# Patient Record
Sex: FEMALE | Race: WHITE | Employment: OTHER | URBAN - METROPOLITAN AREA
[De-identification: names, ages, dates, MRNs, and addresses within clinical notes are randomized per-mention and may not be internally consistent; named-entity substitution may affect disease eponyms.]

---

## 2017-02-16 ENCOUNTER — HOSPITAL ENCOUNTER (OUTPATIENT)
Dept: SURGERY | Age: 67
Discharge: HOME | End: 2017-02-16
Payer: MEDICARE

## 2017-02-16 VITALS
DIASTOLIC BLOOD PRESSURE: 73 MMHG | SYSTOLIC BLOOD PRESSURE: 120 MMHG | BODY MASS INDEX: 32.72 KG/M2 | RESPIRATION RATE: 16 BRPM | OXYGEN SATURATION: 95 % | HEIGHT: 67 IN | TEMPERATURE: 97.7 F | HEART RATE: 71 BPM | WEIGHT: 208.5 LBS

## 2017-02-16 LAB
ANION GAP BLD CALC-SCNC: 11 MMOL/L (ref 7–16)
APPEARANCE UR: CLEAR
BACTERIA SPEC CULT: NORMAL
BASOPHILS # BLD AUTO: 0 K/UL (ref 0–0.2)
BASOPHILS # BLD: 0 % (ref 0–2)
BILIRUB UR QL: NEGATIVE
BUN SERPL-MCNC: 16 MG/DL (ref 8–23)
CALCIUM SERPL-MCNC: 8.7 MG/DL (ref 8.3–10.4)
CHLORIDE SERPL-SCNC: 100 MMOL/L (ref 98–107)
CO2 SERPL-SCNC: 28 MMOL/L (ref 21–32)
COLOR UR: YELLOW
CREAT SERPL-MCNC: 0.81 MG/DL (ref 0.6–1)
DIFFERENTIAL METHOD BLD: NORMAL
EOSINOPHIL # BLD: 0.1 K/UL (ref 0–0.8)
EOSINOPHIL NFR BLD: 2 % (ref 0.5–7.8)
ERYTHROCYTE [DISTWIDTH] IN BLOOD BY AUTOMATED COUNT: 12.9 % (ref 11.9–14.6)
GLUCOSE SERPL-MCNC: 79 MG/DL (ref 65–100)
GLUCOSE UR STRIP.AUTO-MCNC: NEGATIVE MG/DL
HCT VFR BLD AUTO: 43.9 % (ref 35.8–46.3)
HGB BLD-MCNC: 15.1 G/DL (ref 11.7–15.4)
HGB UR QL STRIP: NEGATIVE
IMM GRANULOCYTES # BLD: 0 K/UL (ref 0–0.5)
IMM GRANULOCYTES NFR BLD AUTO: 0.4 % (ref 0–5)
KETONES UR QL STRIP.AUTO: NEGATIVE MG/DL
LEUKOCYTE ESTERASE UR QL STRIP.AUTO: NEGATIVE
LYMPHOCYTES # BLD AUTO: 20 % (ref 13–44)
LYMPHOCYTES # BLD: 1.4 K/UL (ref 0.5–4.6)
MCH RBC QN AUTO: 32.3 PG (ref 26.1–32.9)
MCHC RBC AUTO-ENTMCNC: 34.4 G/DL (ref 31.4–35)
MCV RBC AUTO: 94 FL (ref 79.6–97.8)
MONOCYTES # BLD: 0.8 K/UL (ref 0.1–1.3)
MONOCYTES NFR BLD AUTO: 11 % (ref 4–12)
NEUTS SEG # BLD: 4.8 K/UL (ref 1.7–8.2)
NEUTS SEG NFR BLD AUTO: 67 % (ref 43–78)
NITRITE UR QL STRIP.AUTO: NEGATIVE
PH UR STRIP: 6 [PH] (ref 5–9)
PLATELET # BLD AUTO: 269 K/UL (ref 150–450)
PMV BLD AUTO: 10.8 FL (ref 10.8–14.1)
POTASSIUM SERPL-SCNC: 3.6 MMOL/L (ref 3.5–5.1)
PROT UR STRIP-MCNC: NEGATIVE MG/DL
RBC # BLD AUTO: 4.67 M/UL (ref 4.05–5.25)
SERVICE CMNT-IMP: NORMAL
SODIUM SERPL-SCNC: 139 MMOL/L (ref 136–145)
SP GR UR REFRACTOMETRY: 1.02 (ref 1–1.02)
UROBILINOGEN UR QL STRIP.AUTO: 1 EU/DL (ref 0.2–1)
WBC # BLD AUTO: 7.1 K/UL (ref 4.3–11.1)

## 2017-02-16 PROCEDURE — 85025 COMPLETE CBC W/AUTO DIFF WBC: CPT | Performed by: ANESTHESIOLOGY

## 2017-02-16 PROCEDURE — 80048 BASIC METABOLIC PNL TOTAL CA: CPT | Performed by: ANESTHESIOLOGY

## 2017-02-16 PROCEDURE — 87641 MR-STAPH DNA AMP PROBE: CPT | Performed by: ANESTHESIOLOGY

## 2017-02-16 PROCEDURE — 81003 URINALYSIS AUTO W/O SCOPE: CPT | Performed by: ANESTHESIOLOGY

## 2017-02-20 ENCOUNTER — ANESTHESIA EVENT (OUTPATIENT)
Dept: SURGERY | Age: 67
DRG: 473 | End: 2017-02-20
Payer: MEDICARE

## 2017-02-21 ENCOUNTER — ANESTHESIA (OUTPATIENT)
Dept: SURGERY | Age: 67
DRG: 473 | End: 2017-02-21
Payer: MEDICARE

## 2017-02-21 ENCOUNTER — APPOINTMENT (OUTPATIENT)
Dept: GENERAL RADIOLOGY | Age: 67
DRG: 473 | End: 2017-02-21
Attending: NEUROLOGICAL SURGERY
Payer: MEDICARE

## 2017-02-21 PROBLEM — M47.22 CERVICAL SPONDYLOSIS WITH RADICULOPATHY: Status: ACTIVE | Noted: 2017-02-21

## 2017-02-21 PROCEDURE — 74011250636 HC RX REV CODE- 250/636

## 2017-02-21 PROCEDURE — 77030020782 HC GWN BAIR PAWS FLX 3M -B: Performed by: ANESTHESIOLOGY

## 2017-02-21 PROCEDURE — 77030008703 HC TU ET UNCUF COVD -A: Performed by: ANESTHESIOLOGY

## 2017-02-21 PROCEDURE — 74011000250 HC RX REV CODE- 250

## 2017-02-21 PROCEDURE — 77030008477 HC STYL SATN SLP COVD -A: Performed by: ANESTHESIOLOGY

## 2017-02-21 PROCEDURE — 74011250636 HC RX REV CODE- 250/636: Performed by: ANESTHESIOLOGY

## 2017-02-21 PROCEDURE — 77030019908 HC STETH ESOPH SIMS -A: Performed by: ANESTHESIOLOGY

## 2017-02-21 PROCEDURE — 74011250636 HC RX REV CODE- 250/636: Performed by: NEUROLOGICAL SURGERY

## 2017-02-21 RX ORDER — LIDOCAINE HYDROCHLORIDE 20 MG/ML
INJECTION, SOLUTION EPIDURAL; INFILTRATION; INTRACAUDAL; PERINEURAL AS NEEDED
Status: DISCONTINUED | OUTPATIENT
Start: 2017-02-21 | End: 2017-02-21 | Stop reason: HOSPADM

## 2017-02-21 RX ORDER — PROPOFOL 10 MG/ML
INJECTION, EMULSION INTRAVENOUS AS NEEDED
Status: DISCONTINUED | OUTPATIENT
Start: 2017-02-21 | End: 2017-02-21 | Stop reason: HOSPADM

## 2017-02-21 RX ORDER — ROCURONIUM BROMIDE 10 MG/ML
INJECTION, SOLUTION INTRAVENOUS AS NEEDED
Status: DISCONTINUED | OUTPATIENT
Start: 2017-02-21 | End: 2017-02-21 | Stop reason: HOSPADM

## 2017-02-21 RX ORDER — FENTANYL CITRATE 50 UG/ML
INJECTION, SOLUTION INTRAMUSCULAR; INTRAVENOUS AS NEEDED
Status: DISCONTINUED | OUTPATIENT
Start: 2017-02-21 | End: 2017-02-21 | Stop reason: HOSPADM

## 2017-02-21 RX ORDER — DEXAMETHASONE SODIUM PHOSPHATE 4 MG/ML
INJECTION, SOLUTION INTRA-ARTICULAR; INTRALESIONAL; INTRAMUSCULAR; INTRAVENOUS; SOFT TISSUE AS NEEDED
Status: DISCONTINUED | OUTPATIENT
Start: 2017-02-21 | End: 2017-02-21 | Stop reason: HOSPADM

## 2017-02-21 RX ORDER — ONDANSETRON 2 MG/ML
INJECTION INTRAMUSCULAR; INTRAVENOUS AS NEEDED
Status: DISCONTINUED | OUTPATIENT
Start: 2017-02-21 | End: 2017-02-21 | Stop reason: HOSPADM

## 2017-02-21 RX ADMIN — PROPOFOL 180 MG: 10 INJECTION, EMULSION INTRAVENOUS at 14:49

## 2017-02-21 RX ADMIN — ROCURONIUM BROMIDE 10 MG: 10 INJECTION, SOLUTION INTRAVENOUS at 15:54

## 2017-02-21 RX ADMIN — SODIUM CHLORIDE, SODIUM LACTATE, POTASSIUM CHLORIDE, AND CALCIUM CHLORIDE: 600; 310; 30; 20 INJECTION, SOLUTION INTRAVENOUS at 16:18

## 2017-02-21 RX ADMIN — FENTANYL CITRATE 100 MCG: 50 INJECTION, SOLUTION INTRAMUSCULAR; INTRAVENOUS at 14:49

## 2017-02-21 RX ADMIN — ROCURONIUM BROMIDE 5 MG: 10 INJECTION, SOLUTION INTRAVENOUS at 16:13

## 2017-02-21 RX ADMIN — ROCURONIUM BROMIDE 50 MG: 10 INJECTION, SOLUTION INTRAVENOUS at 14:49

## 2017-02-21 RX ADMIN — FENTANYL CITRATE 50 MCG: 50 INJECTION, SOLUTION INTRAMUSCULAR; INTRAVENOUS at 15:43

## 2017-02-21 RX ADMIN — CEFAZOLIN 2 G: 1 INJECTION, POWDER, FOR SOLUTION INTRAMUSCULAR; INTRAVENOUS; PARENTERAL at 14:58

## 2017-02-21 RX ADMIN — LIDOCAINE HYDROCHLORIDE 60 MG: 20 INJECTION, SOLUTION EPIDURAL; INFILTRATION; INTRACAUDAL; PERINEURAL at 14:49

## 2017-02-21 RX ADMIN — FENTANYL CITRATE 50 MCG: 50 INJECTION, SOLUTION INTRAMUSCULAR; INTRAVENOUS at 15:31

## 2017-02-21 RX ADMIN — ONDANSETRON 4 MG: 2 INJECTION INTRAMUSCULAR; INTRAVENOUS at 15:12

## 2017-02-21 RX ADMIN — ROCURONIUM BROMIDE 10 MG: 10 INJECTION, SOLUTION INTRAVENOUS at 15:26

## 2017-02-21 RX ADMIN — FENTANYL CITRATE 50 MCG: 50 INJECTION, SOLUTION INTRAMUSCULAR; INTRAVENOUS at 16:00

## 2017-02-21 RX ADMIN — DEXAMETHASONE SODIUM PHOSPHATE 4 MG: 4 INJECTION, SOLUTION INTRA-ARTICULAR; INTRALESIONAL; INTRAMUSCULAR; INTRAVENOUS; SOFT TISSUE at 15:12

## 2017-02-21 RX ADMIN — FENTANYL CITRATE 50 MCG: 50 INJECTION, SOLUTION INTRAMUSCULAR; INTRAVENOUS at 15:38

## 2017-02-21 NOTE — ANESTHESIA PREPROCEDURE EVALUATION
Anesthetic History   No history of anesthetic complications            Review of Systems / Medical History  Patient summary reviewed and pertinent labs reviewed    Pulmonary          Smoker (24 pk years. Quit 40 yrs ago)  Asthma : well controlled       Neuro/Psych   Within defined limits           Cardiovascular    Hypertension: well controlled          Hyperlipidemia    Exercise tolerance: >4 METS  Comments: Denies CP, SOB or changes in functional status   GI/Hepatic/Renal     GERD: well controlled           Endo/Other      Hypothyroidism: well controlled  Obesity and arthritis     Other Findings              Physical Exam    Airway  Mallampati: II  TM Distance: 4 - 6 cm  Neck ROM: normal range of motion   Mouth opening: Normal     Cardiovascular    Rhythm: regular  Rate: normal         Dental    Dentition: Caps/crowns     Pulmonary  Breath sounds clear to auscultation               Abdominal  GI exam deferred       Other Findings            Anesthetic Plan    ASA: 2  Anesthesia type: general          Induction: Intravenous  Anesthetic plan and risks discussed with: Patient and Spouse      Risks discussed. No pain on neck extension.

## 2017-02-21 NOTE — ANESTHESIA POSTPROCEDURE EVALUATION
Post-Anesthesia Evaluation and Assessment    Patient: Ez Cueto MRN: [id number]  SSN: [id number]    YOB: 1950  Age: 73 y.o. Sex: female       Cardiovascular Function/Vital Signs  Visit Vitals    /62 (BP 1 Location: Right arm, BP Patient Position: At rest)    Pulse 85    Temp 36.7 °C (98.1 °F)    Resp 14    SpO2 94%       Patient is status post general anesthesia for Procedure(s):  ACDF C 5-6 WITH ZEVO. Nausea/Vomiting: None    Postoperative hydration reviewed and adequate. Pain:  Pain Scale 1: Numeric (0 - 10) (02/21/17 1650)  Pain Intensity 1: 7 (02/21/17 1650)   Managed    Neurological Status:   Neuro (WDL): Within Defined Limits (02/21/17 1640)  Neuro  Neurologic State: Alert (02/21/17 1640)  LUE Motor Response: Purposeful (02/21/17 1640)  LLE Motor Response: Purposeful (02/21/17 1640)  RUE Motor Response: Purposeful (02/21/17 1640)  RLE Motor Response: Purposeful (02/21/17 1640)   At baseline    Mental Status and Level of Consciousness: Arousable    Pulmonary Status:   O2 Device: Nasal cannula (02/21/17 1731)   Adequate oxygenation and airway patent    Complications related to anesthesia: None    Post-anesthesia assessment completed. No concerns. Pain control improved.      Signed By: Meenakshi Marie MD     February 21, 2017

## 2017-02-23 ENCOUNTER — PATIENT OUTREACH (OUTPATIENT)
Dept: CASE MANAGEMENT | Age: 67
End: 2017-02-23

## 2017-02-23 NOTE — PROGRESS NOTES
Transition of Care Discharge Follow-Up Questionnaire       Date/Time of Call:   February 23, 2017 1:56PM   What was the patient hospitalized for? Patient hospitalized for Cervical Spondylosis with radiculopathy. Does the patient understand his/her diagnosis and/or treatment and what happened during the hospitalization? Patient states understanding of diagnosis and treatment during hospitalization. Did the patient receive discharge instructions? Patient states discharge instructions explained and received before discharge to home. Review any discharge instructions (see notes in [facility]). Ask patient if they understand these. Do they have any questions? Discharge instructions reviewed with  patient per connect [facility], opportunity for questions and clarification provided. Patient states no questions at this time. Were home services ordered (nursing, PT, OT, ST, etc.)? No home health services ordered. If so, has the first visit occurred? If not, why? (Assist with coordination of services if necessary. ) NA         Was any DME ordered? No durable medical equipment ordered. If so, has it been received? If not, why?  (Assist with coordination of arranging DME orders if necessary. ) NA         Complete a review of all medications (new, continued and discontinued meds per the D/C instructions and medication tab in [facility]). Two new prescriptions added Perocet 5-325 mg po and Pericolace 8.6-50 mg po before patient discharged to home. Were all new prescriptions filled? If not, why?  (Assist with obtainment of medications if necessary.) Patient states new prescriptions filled and currently being taken per doctors order. Does the patient understand the purpose and dosing instructions for all medications? (If patient has questions, provide explanation and education.) Patient states understanding of medication purposes and dosing instructions.    Does the patient have any problems in performing ADLs? (If patient is unable to perform ADLs - what is the limiting factor(s)? Do they have a support system that can assist? If no support system is present, discuss possible assistance that they may be able to obtain.) Patient states she can perform ADL's independently and requires no assistance. Patient states she feels a little sore but is feeling fine and able to ambulate without the use of any assistive devices. Does the patient have all follow-up appointments scheduled? Has transportation been arranged? [facility] follow-up should be within 7 days of discharge; all others should have PCP follow-up within 7 days of discharge; follow-ups with other specialists as appropriate or ordered.) Patient states follow up appointment scheduled with Dr. Tomas Musa ([address] [place] [address]) March 19, 2017 @ 9:00AM.  Patient states no transportation needs at this time. Any other questions or concerns expressed by the patient? Patient expresses no questions or concerns. Schedule next appointment with LIZABETH GONZALEZ Coordinator or refer to RN Case Manager/  as appropriate. No further needs identified, patient instructed to call Care Coordinator if further questions or concerns arise.    RUPESH Call Completed By: Latricia Leonard LPN  Care Coordinator   [facility]

## 2017-02-28 ENCOUNTER — APPOINTMENT (RX ONLY)
Dept: URBAN - METROPOLITAN AREA CLINIC 24 | Facility: CLINIC | Age: 67
Setting detail: DERMATOLOGY
End: 2017-02-28

## 2017-02-28 DIAGNOSIS — L57.0 ACTINIC KERATOSIS: ICD-10-CM

## 2017-02-28 DIAGNOSIS — L81.4 OTHER MELANIN HYPERPIGMENTATION: ICD-10-CM

## 2017-02-28 DIAGNOSIS — L21.8 OTHER SEBORRHEIC DERMATITIS: ICD-10-CM | Status: UNCHANGED

## 2017-02-28 DIAGNOSIS — L82.1 OTHER SEBORRHEIC KERATOSIS: ICD-10-CM

## 2017-02-28 DIAGNOSIS — L20.89 OTHER ATOPIC DERMATITIS: ICD-10-CM

## 2017-02-28 DIAGNOSIS — L56.5 DISSEMINATED SUPERFICIAL ACTINIC POROKERATOSIS (DSAP): ICD-10-CM

## 2017-02-28 DIAGNOSIS — R20.2 PARESTHESIA OF SKIN: ICD-10-CM

## 2017-02-28 DIAGNOSIS — D22 MELANOCYTIC NEVI: ICD-10-CM

## 2017-02-28 DIAGNOSIS — D18.0 HEMANGIOMA: ICD-10-CM

## 2017-02-28 PROBLEM — L85.3 XEROSIS CUTIS: Status: ACTIVE | Noted: 2017-02-28

## 2017-02-28 PROBLEM — L20.84 INTRINSIC (ALLERGIC) ECZEMA: Status: ACTIVE | Noted: 2017-02-28

## 2017-02-28 PROBLEM — L70.0 ACNE VULGARIS: Status: ACTIVE | Noted: 2017-02-28

## 2017-02-28 PROBLEM — D18.01 HEMANGIOMA OF SKIN AND SUBCUTANEOUS TISSUE: Status: ACTIVE | Noted: 2017-02-28

## 2017-02-28 PROBLEM — M12.9 ARTHROPATHY, UNSPECIFIED: Status: ACTIVE | Noted: 2017-02-28

## 2017-02-28 PROBLEM — D22.5 MELANOCYTIC NEVI OF TRUNK: Status: ACTIVE | Noted: 2017-02-28

## 2017-02-28 PROBLEM — L29.8 OTHER PRURITUS: Status: ACTIVE | Noted: 2017-02-28

## 2017-02-28 PROBLEM — L30.9 DERMATITIS, UNSPECIFIED: Status: ACTIVE | Noted: 2017-02-28

## 2017-02-28 PROCEDURE — 17003 DESTRUCT PREMALG LES 2-14: CPT

## 2017-02-28 PROCEDURE — 17000 DESTRUCT PREMALG LESION: CPT | Mod: 59

## 2017-02-28 PROCEDURE — ? COUNSELING

## 2017-02-28 PROCEDURE — ? LIQUID NITROGEN

## 2017-02-28 PROCEDURE — 99213 OFFICE O/P EST LOW 20 MIN: CPT | Mod: 25

## 2017-02-28 PROCEDURE — 17110 DESTRUCTION B9 LES UP TO 14: CPT

## 2017-02-28 ASSESSMENT — LOCATION DETAILED DESCRIPTION DERM
LOCATION DETAILED: LEFT SUPERIOR UPPER BACK
LOCATION DETAILED: RIGHT FOREHEAD
LOCATION DETAILED: RIGHT DISTAL DORSAL FOREARM
LOCATION DETAILED: LEFT PROXIMAL PRETIBIAL REGION
LOCATION DETAILED: RIGHT PROXIMAL RADIAL DORSAL FOREARM
LOCATION DETAILED: PERIUMBILICAL SKIN
LOCATION DETAILED: RIGHT MID-UPPER BACK
LOCATION DETAILED: LEFT MEDIAL INFERIOR CHEST
LOCATION DETAILED: RIGHT PROXIMAL PRETIBIAL REGION
LOCATION DETAILED: RIGHT POPLITEAL SKIN
LOCATION DETAILED: RIGHT RADIAL DORSAL HAND
LOCATION DETAILED: LEFT DISTAL PRETIBIAL REGION
LOCATION DETAILED: LEFT DISTAL DORSAL FOREARM
LOCATION DETAILED: LEFT INFERIOR UPPER BACK
LOCATION DETAILED: RIGHT ELBOW
LOCATION DETAILED: LEFT MEDIAL SUPERIOR CHEST

## 2017-02-28 ASSESSMENT — LOCATION SIMPLE DESCRIPTION DERM
LOCATION SIMPLE: LEFT UPPER BACK
LOCATION SIMPLE: LEFT PRETIBIAL REGION
LOCATION SIMPLE: CHEST
LOCATION SIMPLE: RIGHT PRETIBIAL REGION
LOCATION SIMPLE: LEFT FOREARM
LOCATION SIMPLE: RIGHT HAND
LOCATION SIMPLE: RIGHT FOREARM
LOCATION SIMPLE: RIGHT FOREHEAD
LOCATION SIMPLE: RIGHT UPPER BACK
LOCATION SIMPLE: ABDOMEN
LOCATION SIMPLE: RIGHT ELBOW
LOCATION SIMPLE: RIGHT POPLITEAL SKIN

## 2017-02-28 ASSESSMENT — LOCATION ZONE DERM
LOCATION ZONE: HAND
LOCATION ZONE: LEG
LOCATION ZONE: TRUNK
LOCATION ZONE: ARM
LOCATION ZONE: FACE

## 2017-02-28 NOTE — PROCEDURE: LIQUID NITROGEN
Medical Necessity Clause: This procedure was medically necessary because the lesions was irritated and itchy.inflamed, bleeding.

## 2017-04-19 ENCOUNTER — HOSPITAL ENCOUNTER (OUTPATIENT)
Dept: GENERAL RADIOLOGY | Age: 67
Discharge: HOME | End: 2017-04-19
Payer: MEDICARE

## 2017-04-19 DIAGNOSIS — M46.92 CERVICAL SPONDYLITIS (HCC): ICD-10-CM

## 2017-04-19 PROCEDURE — 72040 X-RAY EXAM NECK SPINE 2-3 VW: CPT

## 2017-06-28 ENCOUNTER — HOSPITAL ENCOUNTER (OUTPATIENT)
Dept: MAMMOGRAPHY | Age: 67
Discharge: HOME | End: 2017-06-28
Attending: FAMILY MEDICINE
Payer: MEDICARE

## 2017-06-28 DIAGNOSIS — Z12.31 ENCOUNTER FOR SCREENING MAMMOGRAM FOR MALIGNANT NEOPLASM OF BREAST: ICD-10-CM

## 2017-06-28 PROCEDURE — 77067 SCR MAMMO BI INCL CAD: CPT

## 2017-07-12 ENCOUNTER — APPOINTMENT (RX ONLY)
Dept: URBAN - METROPOLITAN AREA CLINIC 23 | Facility: CLINIC | Age: 67
Setting detail: DERMATOLOGY
End: 2017-07-12

## 2017-07-12 ENCOUNTER — RX ONLY (OUTPATIENT)
Age: 67
Setting detail: RX ONLY
End: 2017-07-12

## 2017-07-12 DIAGNOSIS — L73.9 FOLLICULAR DISORDER, UNSPECIFIED: ICD-10-CM

## 2017-07-12 DIAGNOSIS — L81.4 OTHER MELANIN HYPERPIGMENTATION: ICD-10-CM

## 2017-07-12 DIAGNOSIS — L56.5 DISSEMINATED SUPERFICIAL ACTINIC POROKERATOSIS (DSAP): ICD-10-CM

## 2017-07-12 DIAGNOSIS — L663 OTHER SPECIFIED DISEASES OF HAIR AND HAIR FOLLICLES: ICD-10-CM

## 2017-07-12 DIAGNOSIS — D22 MELANOCYTIC NEVI: ICD-10-CM

## 2017-07-12 DIAGNOSIS — L738 OTHER SPECIFIED DISEASES OF HAIR AND HAIR FOLLICLES: ICD-10-CM

## 2017-07-12 DIAGNOSIS — D18.0 HEMANGIOMA: ICD-10-CM

## 2017-07-12 DIAGNOSIS — L82.1 OTHER SEBORRHEIC KERATOSIS: ICD-10-CM

## 2017-07-12 PROBLEM — L20.84 INTRINSIC (ALLERGIC) ECZEMA: Status: ACTIVE | Noted: 2017-07-12

## 2017-07-12 PROBLEM — D18.01 HEMANGIOMA OF SKIN AND SUBCUTANEOUS TISSUE: Status: ACTIVE | Noted: 2017-07-12

## 2017-07-12 PROBLEM — L29.8 OTHER PRURITUS: Status: ACTIVE | Noted: 2017-07-12

## 2017-07-12 PROBLEM — H91.90 UNSPECIFIED HEARING LOSS, UNSPECIFIED EAR: Status: ACTIVE | Noted: 2017-07-12

## 2017-07-12 PROBLEM — D22.5 MELANOCYTIC NEVI OF TRUNK: Status: ACTIVE | Noted: 2017-07-12

## 2017-07-12 PROBLEM — L02.02 FURUNCLE OF FACE: Status: ACTIVE | Noted: 2017-07-12

## 2017-07-12 PROBLEM — M12.9 ARTHROPATHY, UNSPECIFIED: Status: ACTIVE | Noted: 2017-07-12

## 2017-07-12 PROCEDURE — ? LIQUID NITROGEN

## 2017-07-12 PROCEDURE — ? PRESCRIPTION

## 2017-07-12 PROCEDURE — ? COUNSELING

## 2017-07-12 PROCEDURE — 17110 DESTRUCTION B9 LES UP TO 14: CPT

## 2017-07-12 PROCEDURE — 99214 OFFICE O/P EST MOD 30 MIN: CPT | Mod: 25

## 2017-07-12 RX ORDER — FLUOROURACIL 2 G/40G
CREAM TOPICAL
Qty: 1 | Refills: 3 | Status: ERX

## 2017-07-12 RX ORDER — CLINDAMYCIN PHOSPHATE 10 MG/ML
LOTION TOPICAL
Qty: 1 | Refills: 3 | Status: ERX

## 2017-07-12 ASSESSMENT — LOCATION DETAILED DESCRIPTION DERM
LOCATION DETAILED: PERIUMBILICAL SKIN
LOCATION DETAILED: RIGHT MID-UPPER BACK
LOCATION DETAILED: LEFT INFERIOR UPPER BACK
LOCATION DETAILED: RIGHT PROXIMAL PRETIBIAL REGION
LOCATION DETAILED: RIGHT PROXIMAL DORSAL FOREARM
LOCATION DETAILED: LEFT DISTAL DORSAL FOREARM
LOCATION DETAILED: RIGHT DISTAL RADIAL DORSAL FOREARM
LOCATION DETAILED: LEFT RIB CAGE
LOCATION DETAILED: RIGHT DISTAL DORSAL FOREARM
LOCATION DETAILED: RIGHT MEDIAL SUPERIOR CHEST
LOCATION DETAILED: LEFT PROXIMAL RADIAL DORSAL FOREARM
LOCATION DETAILED: RIGHT PROXIMAL RADIAL DORSAL FOREARM
LOCATION DETAILED: RIGHT MID TEMPLE
LOCATION DETAILED: LEFT PROXIMAL PRETIBIAL REGION
LOCATION DETAILED: LEFT MEDIAL INFERIOR CHEST

## 2017-07-12 ASSESSMENT — LOCATION SIMPLE DESCRIPTION DERM
LOCATION SIMPLE: CHEST
LOCATION SIMPLE: LEFT UPPER BACK
LOCATION SIMPLE: LEFT FOREARM
LOCATION SIMPLE: RIGHT TEMPLE
LOCATION SIMPLE: RIGHT UPPER BACK
LOCATION SIMPLE: RIGHT PRETIBIAL REGION
LOCATION SIMPLE: LEFT PRETIBIAL REGION
LOCATION SIMPLE: ABDOMEN
LOCATION SIMPLE: RIGHT FOREARM

## 2017-07-12 ASSESSMENT — LOCATION ZONE DERM
LOCATION ZONE: LEG
LOCATION ZONE: TRUNK
LOCATION ZONE: FACE
LOCATION ZONE: ARM

## 2017-07-12 NOTE — PROCEDURE: LIQUID NITROGEN
Medical Necessity Information: It is in your best interest to select a reason for this procedure from the list below. All of these items fulfill various CMS LCD requirements except the new and changing color options.
Post-Care Instructions: I reviewed with the patient in detail post-care instructions. Patient is to wear sunprotection, and avoid picking at any of the treated lesions. Pt may apply Vaseline to crusted or scabbing areas.
Add 52 Modifier (Optional): no
Detail Level: Detailed
Medical Necessity Clause: This procedure was medically necessary because the lesions was irritated and itchy.inflamed, bleeding.
Number Of Freeze-Thaw Cycles: 1 freeze-thaw cycle
Consent: The patient's consent was obtained including but not limited to risks of crusting, scabbing, blistering, scarring, darker or lighter pigmentary change, recurrence, incomplete removal and infection.

## 2017-07-20 ENCOUNTER — HOSPITAL ENCOUNTER (OUTPATIENT)
Dept: SURGERY | Age: 67
Discharge: HOME | End: 2017-07-20

## 2017-07-20 VITALS
DIASTOLIC BLOOD PRESSURE: 86 MMHG | HEART RATE: 84 BPM | SYSTOLIC BLOOD PRESSURE: 150 MMHG | BODY MASS INDEX: 32.78 KG/M2 | WEIGHT: 204 LBS | HEIGHT: 66 IN | RESPIRATION RATE: 16 BRPM | TEMPERATURE: 98 F | OXYGEN SATURATION: 95 %

## 2017-07-20 NOTE — PERIOP NOTES
Patient verified name, , and surgery as listed in [facility]. TYPE  CASE:1b  Orders per surgeon: none Received  Labs per surgeon:none: results -  Labs per anesthesia protocol: potassium-from lab draw on 17 : results -  EKG  :  na      Patient provided with handouts including guide to surgery , transfusions, pain management and hand hygiene for the family and community. Pt verbalizes understanding of all pre-op instructions . Instructed that family must be present in building at all times. Nothing to eat or drink after midnight the night prior to surgery. Mojgan mist and instructions given per hospital policy. Instructed patient to continue  previous medications as prescribed prior to surgery and  to take the following medications the day of surgery according to anesthesia guidelines : advair,albuterol,estradiol,levothyroxine,simvastatin,tramadol as needed       Original medication prescription bottles none visualized during patient appointment. Continue all previous medications unless otherwise directed. Instructed patient to hold  the following medications prior to surgery: none      Patient verbalized understanding of all instructions and provided all medical/health information to the best of their ability.

## 2017-07-21 ENCOUNTER — ANESTHESIA (OUTPATIENT)
Dept: SURGERY | Age: 67
End: 2017-07-21
Payer: MEDICARE

## 2017-07-21 ENCOUNTER — ANESTHESIA EVENT (OUTPATIENT)
Dept: SURGERY | Age: 67
End: 2017-07-21
Payer: MEDICARE

## 2017-07-21 ENCOUNTER — HOSPITAL ENCOUNTER (OUTPATIENT)
Age: 67
Setting detail: OUTPATIENT SURGERY
Discharge: HOME | End: 2017-07-21
Attending: ORTHOPAEDIC SURGERY | Admitting: ORTHOPAEDIC SURGERY
Payer: MEDICARE

## 2017-07-21 VITALS
TEMPERATURE: 97.4 F | WEIGHT: 203.5 LBS | HEIGHT: 66 IN | DIASTOLIC BLOOD PRESSURE: 71 MMHG | OXYGEN SATURATION: 96 % | SYSTOLIC BLOOD PRESSURE: 136 MMHG | BODY MASS INDEX: 32.71 KG/M2 | RESPIRATION RATE: 16 BRPM | HEART RATE: 84 BPM

## 2017-07-21 PROCEDURE — 77030033073 HC TBNG ARTHSC PMP OUTFLO STRY -B: Performed by: ORTHOPAEDIC SURGERY

## 2017-07-21 PROCEDURE — 77030003666 HC NDL SPINAL BD -A: Performed by: ORTHOPAEDIC SURGERY

## 2017-07-21 PROCEDURE — 74011250636 HC RX REV CODE- 250/636: Performed by: ANESTHESIOLOGY

## 2017-07-21 PROCEDURE — C1713 ANCHOR/SCREW BN/BN,TIS/BN: HCPCS | Performed by: ORTHOPAEDIC SURGERY

## 2017-07-21 PROCEDURE — 77030008477 HC STYL SATN SLP COVD -A: Performed by: ANESTHESIOLOGY

## 2017-07-21 PROCEDURE — 74011000250 HC RX REV CODE- 250

## 2017-07-21 PROCEDURE — 77030002916 HC SUT ETHLN J&J -A: Performed by: ORTHOPAEDIC SURGERY

## 2017-07-21 PROCEDURE — 74011250636 HC RX REV CODE- 250/636

## 2017-07-21 PROCEDURE — 77030035258: Performed by: ORTHOPAEDIC SURGERY

## 2017-07-21 PROCEDURE — 76010000162 HC OR TIME 1.5 TO 2 HR INTENSV-TIER 1: Performed by: ORTHOPAEDIC SURGERY

## 2017-07-21 PROCEDURE — 77030035253 HC BIT DRL ICONIX DISP STRY -B: Performed by: ORTHOPAEDIC SURGERY

## 2017-07-21 PROCEDURE — 76942 ECHO GUIDE FOR BIOPSY: CPT | Performed by: ORTHOPAEDIC SURGERY

## 2017-07-21 PROCEDURE — 77030016570 HC BLNKT BAIR HGGR 3M -B: Performed by: ANESTHESIOLOGY

## 2017-07-21 PROCEDURE — 77030019605: Performed by: ORTHOPAEDIC SURGERY

## 2017-07-21 PROCEDURE — 74011250637 HC RX REV CODE- 250/637

## 2017-07-21 PROCEDURE — 74011250636 HC RX REV CODE- 250/636: Performed by: ORTHOPAEDIC SURGERY

## 2017-07-21 PROCEDURE — 77030006668 HC BLD SHV MENSCS STRY -B: Performed by: ORTHOPAEDIC SURGERY

## 2017-07-21 PROCEDURE — 77030002966 HC SUT PDS J&J -A: Performed by: ORTHOPAEDIC SURGERY

## 2017-07-21 PROCEDURE — 77030008703 HC TU ET UNCUF COVD -A: Performed by: ANESTHESIOLOGY

## 2017-07-21 PROCEDURE — 76210000020 HC REC RM PH II FIRST 0.5 HR: Performed by: ORTHOPAEDIC SURGERY

## 2017-07-21 PROCEDURE — 76060000034 HC ANESTHESIA 1.5 TO 2 HR: Performed by: ORTHOPAEDIC SURGERY

## 2017-07-21 PROCEDURE — 77030018836 HC SOL IRR NACL ICUM -A: Performed by: ORTHOPAEDIC SURGERY

## 2017-07-21 PROCEDURE — 76010010054 HC POST OP PAIN BLOCK: Performed by: ORTHOPAEDIC SURGERY

## 2017-07-21 PROCEDURE — 77030013367: Performed by: ORTHOPAEDIC SURGERY

## 2017-07-21 PROCEDURE — 77030027384 HC PRB ARTHSCP SERFAS STRY -C: Performed by: ORTHOPAEDIC SURGERY

## 2017-07-21 PROCEDURE — 74011000250 HC RX REV CODE- 250: Performed by: ORTHOPAEDIC SURGERY

## 2017-07-21 PROCEDURE — 77030033005 HC TBNG ARTHSC PMP STRY -B: Performed by: ORTHOPAEDIC SURGERY

## 2017-07-21 PROCEDURE — 77030003602 HC NDL NRV BLK BBMI -B: Performed by: ANESTHESIOLOGY

## 2017-07-21 PROCEDURE — 76210000063 HC OR PH I REC FIRST 0.5 HR: Performed by: ORTHOPAEDIC SURGERY

## 2017-07-21 PROCEDURE — 77030018673: Performed by: ORTHOPAEDIC SURGERY

## 2017-07-21 DEVICE — MULTIFIX S-ULTRA 5.5MM KNOTLESS ANCHOR
Type: IMPLANTABLE DEVICE | Site: SHOULDER | Status: FUNCTIONAL
Brand: MULTIFIX

## 2017-07-21 DEVICE — 2.3MM ICONIX 2 ANCHOR W/INTELLIBRAID TECHNOLOGY 2 STRANDS #2 FORCE FIBER
Type: IMPLANTABLE DEVICE | Site: SHOULDER | Status: FUNCTIONAL
Brand: ICONIX

## 2017-07-21 DEVICE — HEALICOIL PK 4.5 MM SUTURE ANCHOR                                    WITH ONE ULTRATAPE SUTURE BLUE
Type: IMPLANTABLE DEVICE | Site: SHOULDER | Status: FUNCTIONAL
Brand: HEALICOIL

## 2017-07-21 DEVICE — HEALICOIL PK 4.5 MM SUTURE ANCHOR                                    WITH ONE ULTRATAPE SUTURE COBRAID BLUE
Type: IMPLANTABLE DEVICE | Site: SHOULDER | Status: FUNCTIONAL
Brand: HEALICOIL

## 2017-07-21 RX ORDER — SODIUM CHLORIDE 0.9 % (FLUSH) 0.9 %
5-10 SYRINGE (ML) INJECTION EVERY 8 HOURS
Status: DISCONTINUED | OUTPATIENT
Start: 2017-07-21 | End: 2017-07-21 | Stop reason: HOSPADM

## 2017-07-21 RX ORDER — FENTANYL CITRATE 50 UG/ML
100 INJECTION, SOLUTION INTRAMUSCULAR; INTRAVENOUS ONCE
Status: COMPLETED | OUTPATIENT
Start: 2017-07-21 | End: 2017-07-21

## 2017-07-21 RX ORDER — SODIUM CHLORIDE 0.9 % (FLUSH) 0.9 %
5-10 SYRINGE (ML) INJECTION AS NEEDED
Status: DISCONTINUED | OUTPATIENT
Start: 2017-07-21 | End: 2017-07-21 | Stop reason: HOSPADM

## 2017-07-21 RX ORDER — ROPIVACAINE HYDROCHLORIDE 5 MG/ML
INJECTION, SOLUTION EPIDURAL; INFILTRATION; PERINEURAL AS NEEDED
Status: DISCONTINUED | OUTPATIENT
Start: 2017-07-21 | End: 2017-07-21 | Stop reason: HOSPADM

## 2017-07-21 RX ORDER — ALBUTEROL SULFATE 90 UG/1
AEROSOL, METERED RESPIRATORY (INHALATION) AS NEEDED
Status: DISCONTINUED | OUTPATIENT
Start: 2017-07-21 | End: 2017-07-21 | Stop reason: HOSPADM

## 2017-07-21 RX ORDER — HYDROMORPHONE HYDROCHLORIDE 2 MG/ML
0.5 INJECTION, SOLUTION INTRAMUSCULAR; INTRAVENOUS; SUBCUTANEOUS
Status: CANCELLED | OUTPATIENT
Start: 2017-07-21

## 2017-07-21 RX ORDER — SODIUM CHLORIDE 0.9 % (FLUSH) 0.9 %
5-10 SYRINGE (ML) INJECTION AS NEEDED
Status: CANCELLED | OUTPATIENT
Start: 2017-07-21

## 2017-07-21 RX ORDER — SODIUM CHLORIDE, SODIUM LACTATE, POTASSIUM CHLORIDE, CALCIUM CHLORIDE 600; 310; 30; 20 MG/100ML; MG/100ML; MG/100ML; MG/100ML
75 INJECTION, SOLUTION INTRAVENOUS CONTINUOUS
Status: DISCONTINUED | OUTPATIENT
Start: 2017-07-21 | End: 2017-07-21 | Stop reason: HOSPADM

## 2017-07-21 RX ORDER — ROCURONIUM BROMIDE 10 MG/ML
INJECTION, SOLUTION INTRAVENOUS AS NEEDED
Status: DISCONTINUED | OUTPATIENT
Start: 2017-07-21 | End: 2017-07-21 | Stop reason: HOSPADM

## 2017-07-21 RX ORDER — PROPOFOL 10 MG/ML
INJECTION, EMULSION INTRAVENOUS AS NEEDED
Status: DISCONTINUED | OUTPATIENT
Start: 2017-07-21 | End: 2017-07-21 | Stop reason: HOSPADM

## 2017-07-21 RX ORDER — LIDOCAINE HYDROCHLORIDE AND EPINEPHRINE 10; 10 MG/ML; UG/ML
INJECTION, SOLUTION INFILTRATION; PERINEURAL AS NEEDED
Status: DISCONTINUED | OUTPATIENT
Start: 2017-07-21 | End: 2017-07-21 | Stop reason: HOSPADM

## 2017-07-21 RX ORDER — FENTANYL CITRATE 50 UG/ML
INJECTION, SOLUTION INTRAMUSCULAR; INTRAVENOUS AS NEEDED
Status: DISCONTINUED | OUTPATIENT
Start: 2017-07-21 | End: 2017-07-21 | Stop reason: HOSPADM

## 2017-07-21 RX ORDER — EPINEPHRINE 1 MG/ML
INJECTION, SOLUTION, CONCENTRATE INTRAVENOUS AS NEEDED
Status: DISCONTINUED | OUTPATIENT
Start: 2017-07-21 | End: 2017-07-21 | Stop reason: HOSPADM

## 2017-07-21 RX ORDER — DEXAMETHASONE SODIUM PHOSPHATE 4 MG/ML
INJECTION, SOLUTION INTRA-ARTICULAR; INTRALESIONAL; INTRAMUSCULAR; INTRAVENOUS; SOFT TISSUE AS NEEDED
Status: DISCONTINUED | OUTPATIENT
Start: 2017-07-21 | End: 2017-07-21 | Stop reason: HOSPADM

## 2017-07-21 RX ORDER — OXYCODONE HYDROCHLORIDE 5 MG/1
5 TABLET ORAL
Status: CANCELLED | OUTPATIENT
Start: 2017-07-21

## 2017-07-21 RX ORDER — LIDOCAINE HYDROCHLORIDE 20 MG/ML
INJECTION, SOLUTION EPIDURAL; INFILTRATION; INTRACAUDAL; PERINEURAL AS NEEDED
Status: DISCONTINUED | OUTPATIENT
Start: 2017-07-21 | End: 2017-07-21 | Stop reason: HOSPADM

## 2017-07-21 RX ORDER — CEFAZOLIN SODIUM IN 0.9 % NACL 2 G/50 ML
2 INTRAVENOUS SOLUTION, PIGGYBACK (ML) INTRAVENOUS ONCE
Status: COMPLETED | OUTPATIENT
Start: 2017-07-21 | End: 2017-07-21

## 2017-07-21 RX ORDER — MIDAZOLAM HYDROCHLORIDE 1 MG/ML
2 INJECTION, SOLUTION INTRAMUSCULAR; INTRAVENOUS
Status: DISCONTINUED | OUTPATIENT
Start: 2017-07-21 | End: 2017-07-21 | Stop reason: HOSPADM

## 2017-07-21 RX ORDER — ONDANSETRON 2 MG/ML
INJECTION INTRAMUSCULAR; INTRAVENOUS AS NEEDED
Status: DISCONTINUED | OUTPATIENT
Start: 2017-07-21 | End: 2017-07-21 | Stop reason: HOSPADM

## 2017-07-21 RX ORDER — OXYCODONE AND ACETAMINOPHEN 10; 325 MG/1; MG/1
1 TABLET ORAL AS NEEDED
Status: CANCELLED | OUTPATIENT
Start: 2017-07-21

## 2017-07-21 RX ORDER — SUCCINYLCHOLINE CHLORIDE 20 MG/ML
INJECTION INTRAMUSCULAR; INTRAVENOUS AS NEEDED
Status: DISCONTINUED | OUTPATIENT
Start: 2017-07-21 | End: 2017-07-21 | Stop reason: HOSPADM

## 2017-07-21 RX ADMIN — MIDAZOLAM HYDROCHLORIDE 2 MG: 1 INJECTION, SOLUTION INTRAMUSCULAR; INTRAVENOUS at 14:42

## 2017-07-21 RX ADMIN — PROPOFOL 150 MG: 10 INJECTION, EMULSION INTRAVENOUS at 15:36

## 2017-07-21 RX ADMIN — ONDANSETRON 4 MG: 2 INJECTION INTRAMUSCULAR; INTRAVENOUS at 16:56

## 2017-07-21 RX ADMIN — SODIUM CHLORIDE, SODIUM LACTATE, POTASSIUM CHLORIDE, AND CALCIUM CHLORIDE: 600; 310; 30; 20 INJECTION, SOLUTION INTRAVENOUS at 16:29

## 2017-07-21 RX ADMIN — DEXAMETHASONE SODIUM PHOSPHATE 4 MG: 4 INJECTION, SOLUTION INTRA-ARTICULAR; INTRALESIONAL; INTRAMUSCULAR; INTRAVENOUS; SOFT TISSUE at 16:29

## 2017-07-21 RX ADMIN — FENTANYL CITRATE 100 MCG: 50 INJECTION, SOLUTION INTRAMUSCULAR; INTRAVENOUS at 14:42

## 2017-07-21 RX ADMIN — SODIUM CHLORIDE, SODIUM LACTATE, POTASSIUM CHLORIDE, AND CALCIUM CHLORIDE 75 ML/HR: 600; 310; 30; 20 INJECTION, SOLUTION INTRAVENOUS at 13:45

## 2017-07-21 RX ADMIN — SUCCINYLCHOLINE CHLORIDE 140 MG: 20 INJECTION INTRAMUSCULAR; INTRAVENOUS at 15:37

## 2017-07-21 RX ADMIN — LIDOCAINE HYDROCHLORIDE 100 MG: 20 INJECTION, SOLUTION EPIDURAL; INFILTRATION; INTRACAUDAL; PERINEURAL at 15:35

## 2017-07-21 RX ADMIN — ROPIVACAINE HYDROCHLORIDE 20 ML: 5 INJECTION, SOLUTION EPIDURAL; INFILTRATION; PERINEURAL at 14:46

## 2017-07-21 RX ADMIN — CEFAZOLIN 2 G: 1 INJECTION, POWDER, FOR SOLUTION INTRAMUSCULAR; INTRAVENOUS; PARENTERAL at 15:26

## 2017-07-21 RX ADMIN — ALBUTEROL SULFATE 3 PUFF: 90 AEROSOL, METERED RESPIRATORY (INHALATION) at 15:42

## 2017-07-21 RX ADMIN — FENTANYL CITRATE 50 MCG: 50 INJECTION, SOLUTION INTRAMUSCULAR; INTRAVENOUS at 15:35

## 2017-07-21 RX ADMIN — ROCURONIUM BROMIDE 5 MG: 10 INJECTION, SOLUTION INTRAVENOUS at 15:35

## 2017-07-21 NOTE — H&P
Outpatient Surgery History and Physical:  Ness Dominique was seen and examined. CHIEF COMPLAINT:   Right shoulder pain. PE:     Visit Vitals    /67 (BP 1 Location: Left arm, BP Patient Position: At rest)    Pulse 73    Temp 97.8 °F (36.6 °C)    Resp 18    Ht 5' 6\" (1.676 m)    Wt 92.3 kg (203 lb 8 oz)    SpO2 96%    BMI 32.85 kg/m2       Heart:   Regular rhythm      Lungs:  Are clear      Past Medical History:    Patient Active Problem List    Diagnosis    Chronic pain     lupus and Rheumatoid arthritis      Hyperlipidemia    Hypertension     managed with meds      Hypothyroid            Rosacea    Cervical spondylosis with radiculopathy    Rheumatoid arthritis ([address]    Lupus (HCC)    Asthma    GERD (gastroesophageal reflux disease)       Surgical History:   Past Surgical History:   Procedure Laterality Date    HX APPENDECTOMY      HX BLADDER SUSPENSION      HX CERVICAL FUSION  2017    HX COLONOSCOPY  2012    HX ENDOSCOPY  2012    HX HYSTERECTOMY  1994    HX KNEE ARTHROSCOPY Right     HX ORTHOPAEDIC Left     big toe fused    HX OTHER SURGICAL      bone spur-rt shoulder    HX SALPINGO-OOPHORECTOMY Bilateral 1996       Social History: Patient  reports that she quit smoking about 42 years ago. She has a 24.00 pack-year smoking history. She has never used smokeless tobacco. She reports that she drinks alcohol. She reports that she does not use illicit drugs.     Family History:   Family History   Problem Relation Age of Onset    Asthma Father     Cancer Father      bladder, lymphoma    Hypertension Sister     Cancer Mother      ovarian    Arthritis-rheumatoid Mother     Other Brother      aneurysm    Lung Disease Brother        Allergies: Reviewed per EMR  Allergies   Allergen Reactions    Augmentin [Amoxicillin-Pot Clavulanate] Rash    Clindamycin Swelling    Linezolid Other (comments)     Redness     Nitrofurantoin Macrocrystalline Rash    Vioxx [Rofecoxib] Rash Medications:    No current facility-administered medications on file prior to encounter. Current Outpatient Prescriptions on File Prior to Encounter   Medication Sig    ADVAIR DISKUS 500-50 mcg/dose diskus inhaler USE 1 INHALATION TWICE A DAY    triamterene-hydroCHLOROthiazide (DYAZIDE) 37.5-25 mg per capsule Take 1 Cap by mouth daily.  levothyroxine (SYNTHROID) 112 mcg tablet Take 1 Tab by mouth Daily (before breakfast).  simvastatin (ZOCOR) 20 mg tablet Take 1 Tab by mouth nightly. (Patient taking differently: Take 20 mg by mouth every morning.)    estradiol (ESTRACE) 1 mg tablet Take 1 Tab by mouth daily. (Patient taking differently: Take 1 mg by mouth every morning.)    MONTELUKAST SODIUM (SINGULAIR PO) Take 20 mg by mouth every morning.  albuterol (VENTOLIN HFA) 90 mcg/actuation inhaler Take 1 Puff by inhalation every four (4) hours as needed. Indications: bring on the dos    RITUXIMAB (RITUXAN IV) by IntraVENous route. Indications: twice yearly    valACYclovir (VALTREX) 1 gram tablet Take 1,000 mg by mouth daily (after lunch). Indications: SUPPRESSION OF RECURRENT HERPES SIMPLEX INFECTION    conjugated estrogens (PREMARIN) 0.625 mg/gram vaginal cream Insert 0.5 g into vagina daily as needed.  ketoconazole (NIZORAL) 2 % shampoo Apply  to affected area as needed.  VITAMIN D2 50,000 unit capsule Take 50,000 Units by mouth Every Thursday.  traMADol (ULTRAM) 50 mg tablet Take 50 mg by mouth every six (6) hours as needed for Pain.  OTHER Riamterene       The surgery is planned for the right shoulder. History and physical has been reviewed. The patient has been examined. There have been no significant clinical changes since the completion of the originally dated History and Physical.  Patient identified by surgeon; surgical site was confirmed by patient and surgeon. The patient is here today for outpatient surgery.  I have examined the patient, no changes are noted in the patient's medical status. Necessity for the procedure/care is still present and the history and physical above is current. See the office notes for the full long term history of the problem. Please see the recent office notes for the musculoskeletal examination.     Signed By: Ioana Hall MD     July 21, 2017 3:10 PM

## 2017-07-21 NOTE — ANESTHESIA PREPROCEDURE EVALUATION
Anesthetic History   No history of anesthetic complications            Review of Systems / Medical History  Patient summary reviewed and pertinent labs reviewed    Pulmonary            Asthma : well controlled       Neuro/Psych   Within defined limits           Cardiovascular    Hypertension: well controlled              Exercise tolerance: >4 METS     GI/Hepatic/Renal     GERD: well controlled           Endo/Other      Hypothyroidism: well controlled  Arthritis     Other Findings              Physical Exam    Airway  Mallampati: II  TM Distance: > 6 cm  Neck ROM: normal range of motion   Mouth opening: Normal     Cardiovascular    Rhythm: regular           Dental         Pulmonary                 Abdominal  GI exam deferred       Other Findings            Anesthetic Plan    ASA: 2  Anesthesia type: general - interscalene block      Post-op pain plan if not by surgeon: peripheral nerve block single    Induction: Intravenous  Anesthetic plan and risks discussed with: Patient

## 2017-07-21 NOTE — DISCHARGE INSTRUCTIONS
ACTIVITY  · As tolerated and as directed by your doctor. · Bathe or shower as directed by your doctor. DIET  · Clear liquids until no nausea or vomiting; then light diet for the first day. · Advance to regular diet on second day, unless your doctor orders otherwise. · If nausea and vomiting continues, call your doctor. PAIN  · Take pain medication as directed by your doctor. · Call your doctor if pain is NOT relieved by medication. · DO NOT take aspirin of blood thinners unless directed by your doctor. DRESSING CARE       CALL YOUR DOCTOR IF   · Excessive bleeding that does not stop after holding pressure over the area  · Temperature of 101 degrees F or above  · Excessive redness, swelling or bruising, and/ or green or yellow, smelly discharge from incision    AFTER ANESTHESIA   · For the first 24 hours: DO NOT Drive, Drink alcoholic beverages, or Make important decisions. · Be aware of dizziness following anesthesia and while taking pain medication. APPOINTMENT DATE/ TIME    July 31 @  140pm    YOUR DOCTOR'S PHONE NUMBER #[phone]      DISCHARGE SUMMARY from Nurse    PATIENT INSTRUCTIONS:    After general anesthesia or intravenous sedation, for 24 hours or while taking prescription Narcotics:  · Limit your activities  · Do not drive and operate hazardous machinery  · Do not make important personal or business decisions  · Do  not drink alcoholic beverages  · If you have not urinated within 8 hours after discharge, please contact your surgeon on call. *  Please give a list of your current medications to your Primary Care Provider. *  Please update this list whenever your medications are discontinued, doses are      changed, or new medications (including over-the-counter products) are added. *  Please carry medication information at all times in case of emergency situations.       These are general instructions for a healthy lifestyle:    No smoking/ No tobacco products/ Avoid exposure to second hand smoke    Surgeon General's Warning:  Quitting smoking now greatly reduces serious risk to your health. Obesity, smoking, and sedentary lifestyle greatly increases your risk for illness    A healthy diet, regular physical exercise & weight monitoring are important for maintaining a healthy lifestyle    You may be retaining fluid if you have a history of heart failure or if you experience any of the following symptoms:  Weight gain of 3 pounds or more overnight or 5 pounds in a week, increased swelling in our hands or feet or shortness of breath while lying flat in bed. Please call your doctor as soon as you notice any of these symptoms; do not wait until your next office visit. Recognize signs and symptoms of STROKE:    F-face looks uneven    A-arms unable to move or move unevenly    S-speech slurred or non-existent    T-time-call 911 as soon as signs and symptoms begin-DO NOT go       Back to bed or wait to see if you get better-TIME IS BRAIN. Rotator Cuff Repair Postoperative Instructions    Returning Home  1. Your pain after surgery will vary depending on the method of anesthesia used and from patient to patient. In the first 24 hours, pain medication should be taken regularly with small amounts of food. During this time, nausea and light-headedness are common and should improve in 2-5 days. Drinking fluids may help. If nausea persists, medicine can be prescribed by calling your doctor at [phone]. Leaving the Outpatient Surgery Center:  As you leave the surgery center, you will be in a sling and swath. The sling will have a pillow with it holding your arm away from your body slightly. Plan on wearing the sling for 4-6 weeks after surgery. Make sure that you have a large shirt or a button up shirt to wear home. For the first week:  1. Sleeping and resting will be more comfortable if you are propped up in bed or have access to a recliner.   2. Ice your shoulder to help manage the pain. 20 minutes of ice every hour as needed. 3. You may come out of the sling 3-5x/day to do elbow, wrist and hand range of motion, and other home exercises (listed further down in - Home Excersizes) so your other joints do not get stiff. Just do not activate or use your shoulder muscles! 4. Sleep with your sling on. Sling Use  You will be in the sling for 4 to 6 weeks. You may take the sling off to do your home exercises or physical therapy, or shower, but you need to wear the sling at all other times, even SLEEPING. To put the sling on:    1. Make sure the pillow of the sling is snug against your side and that your hand and elbow are parallel to the floor. 2. One strap will go around your waist and connect to the pillow. 3. The other strap is one up front and two in back. The part that says [employer] will go by the neck and the other padded part will go under your arm of the non-operated side. Care of Your Incisions  1. Incisions and stitches are often checked/removed 6 to 10 days after surgery. 2. Moderate bleeding may occur at the incision sites. This should decrease quickly over time. 3. Leave the dressings from surgery in place for 48 to 72 hours. The bulky dressings may be removed and replaced with fresh gauze at that time, but leave on the small tape strips on the incision sites. 4. Watch the wound for increasing redness, tenderness, swelling, and pus drainage daily. These can be early signs of infection. If you notice any of these signs of infection please call [phone]. A mild fever during the first few days after surgery is not uncommon. This often occurs and can be treated with deep breathing, coughing to clear the lungs, and walking. However, fevers, increasing pain, and swelling at the incisions should be reported immediately. Showering:   Until your sutures are removed, you should consider covering your shoulder in saran wrap with tape for showering.     A plastic chair in your shower will allow you to sit.  Sponge bathing is also an option.  In general, after your sutures have been removed you may allow your incisions to get wet in the shower. Post-Operative Pain Management  ANESTHESIA: You will meet with an anesthesiologist on the day of surgery to discuss your anesthesia. You will have general anesthesia and often will have a nerve block. MEDICATIONS: You will be given a prescription for medications. Please take them as directed on the label and with food.  Certain pain medications may contain Tylenol(Acetaminophen). It is important not to take any additional Tylenol while on these pain medications.  Do not mix your pain medications with alcohol.  You should not drive while taking pain medications as they increase your liability and delay your responses.  Your physician will most likely talk with you about taking Aspirin 81 mg or 325 mg (ECASA) once daily for two-three weeks after surgery. This is done in order to help minimize the risk for a blood clot from developing, which is a possible complication after any surgery. If you have Ulcers or stomach irritation do not take this medicine. Be careful taking aspirin and other NSAID's as it can increase your risk of gastric irritation and other side effects.  If you have any questions or concerns regarding your medications, please call the office. · Common side effects of the narcotics include nausea, vomiting, drowsiness, constipation, and difficulty urinating. If you experience constipation, drink lots of water/Gatorade, avoid soda and diet drinks. Eat plenty of fiber. You may take a stool softener: Colace 100mg twice a day for the first week. For severe constipation use magnesium citrate, one 8 oz bottle. All can be bought at the pharmacy. Diet and General Conditioning  Aerobic conditioning and diet are both very important after surgery.  In general, we recommend that you make sure to avoid skipping meals, eat a balanced diet including regular portions of fruits and vegetables, and avoid relying on fast foods while you are recovering from surgery. Also, consider taking a daily multi-vitamin. Participate in some form of aerobic conditioning after surgery. Speak with your physical therapist or call our office to determine an appropriate form of exercise after surgery. Initially, your exercise will need to be modified after surgery. Follow Up Visits  Doctor  Plan on seeing your surgeon at 1 week, 1 month, 3 months, and 6 months after surgery. If your shoulder does not progress as planned, you are welcome to schedule additional visits. There is usually no charge for surgery related visits 90 days following surgery. You will receive a bill for any x-rays or special equipment (such as a brace). Home Exercises  Do these exercises 3-5 times per day after surgery    1. Elbow, Wrist, and Hand Motion- have someone help you remove your sling. Let your arm dangle at your side. Using your other hand to help, bend and straighten your elbow. Also, turn your hand back and forth and move your fingers, wrist, and hand. Do this for 3-5 minutes each time that you do it. 2. Pendulums- Have someone help you remove your sling. Let your arm dangle straight at your side. Brace yourself with your non-operated arm on a table or chair. Lean forward slightly and relax your shoulder. Gravity will pull your arm away from your body. Let your arm hang and when you feel comfortable use your body to slightly swing your arm. Slowly stand up. Do this 3 times for up to 1 minute each time. DO NOT ACTIVATE YOUR SHOULDER MUSCLES. 3. Scapular Squeezes- You may leave your sling on for this exercise. Sit up straight. Roll your shoulders back and squeeze your shoulder blades together. Do this 30 times for each set of exercises that you do. 4. Scapular Shrugs- You may leave your sling on for this exercise as well.  Sit up straight and do a scapular squeeze, while you hold the squeeze, shrug your shoulders. Do this 15 times for each set of exercises that you do throughout the day.     [facility] [employer]                 Phone [phone]  [place] [address]                 Fax [phone]                             Lily Ramirez

## 2017-07-21 NOTE — OP NOTES
Operative Note    Preoperative diagnosis:  Incomplete tear of right rotator cuff [M75.111]  Biceps tendinosis of right shoulder [M75.21]    Postoperative diagnosis: complete tear of right rotator cuff [M75.111]  Biceps tendinosis of right shoulder [M75.21]    Surgeon(s) and Role:     * Janeth Colvin MD - Primary     Assistant: Karissa Cruz CFA     Anesthesia: General, regional    Antibiotics: 2 grams IV ancef    Procedures:  Procedure(s):  RIGHT SHOULDER ARTHROSCOPY WITH ROTATOR CUFF REPAIR of SUBSCAPULARIS AND SUPRASPINATUS   (LARGE)  [id number]    Findings:  1. EUA -   FROM  2. Cartilage appeared normal. The Biceps tendon and attachment appeared normal. The upper subscapularis was torn and retracted. The suprapinatus had a full thickness tear. The labrum was normal.   3. The subacromial space was noted to show a full thickness RTC tear. The acromion and CA ligament were normal.   4. AC asymptomatic. Indications / Consent:  this is a patient who has persistent pain with some weakness in the right shoulder. They were desirous of arthroscopic evaluation and possible arthroscopic or open cuff repair. After previous discussions and treatments using both conservative and/or non-operative treatment options the patient elected to proceed with surgery due to continued symptoms. A review of the risks and benefits, including but not limited to infection, stiffness, injury to nerves and vessels, DVT, PE, MI, need for further operations and other anesthesia related risks was performed with the patient. After this review and the review of the likely outcome and potential complications of the procedure, preoperative verbal and written consents were obtained. The operative procedure and postoperative course were discussed with the patient in detail and the extremity was marked by the patient and myself.      Procedure:   the patient was given an anesthetic, placed semi sitting, the head was held in a neutral position, the legs were flexed and pillows were placed around the legs. An EUA was performed and noted above. They were then padded and the operative shoulder was prepped and draped in the usual fashion. Prior to the beginning of the procedure, a time-out was performed for correct surgical site identification as marked during the pre-operative meeting. This was confirmed using the written consent and history/physical. Time-out for antibiotic dosing, timing and selection was also performed. 10 cc of 1% lidocaine with epi was injected into the subacromial space. The operative arm was connected to an arm carey and a standard posterior portal was made into the shoulder. On visualization of the shoulder after an anterior portal was developed, the biceps appeared fairly normal. No obvious instability or severe fraying. The subscapularis appeared to have a upper partial tear of the upper aspect and the footprint was bare superiorly. The subscapularis was not significantly retracted, however. The anterior labrum was intact. The axillary recess was normal.  The posterior labrum was intact. The articular surface appeared normal.  The scope was switched for a superior view and a full thickness tear of the rotator cuff was visualized. At this point attention was redirected to the subscapularis. An anterolateral portal was developed and through this instruments were placed for debridement of the footprint and lightly decorticating this area. Through the more standard anterior portal an anchor was directed into the upper aspect of the footprint and this was placed in to the appropriate depth. The sutures were then pulled to an auxiliary cannula and suture passing instrument such as the bird beak and spectrum were then used to pass the sutures from the anchor through different locations in the upper subscapularis. After both sutures had been placed they were then  and individually tied down.   The sutures were cut, the upper subscap was then probed, and it was felt that the subscap repair was stable. At this point the scope was pulled out of the posterior portal and placed subacromially. A lateral portal was developed. A full thickness tear of the cuff was confirmed. The size of the tear appeared to be approximately 2.5-3cm. There was mjld fraying on the coracoacromial ligament. At this point an electrocautery device was used to remove coracoacromial ligament and expose the inferior aspect of the acromion. The nature of the cuff tear was determined. The edge of the cuff was debrided. It was mobilized and felt at this point that it could be repaired. The greater tuberosity was lightly debrided with a shaver to prepare for tendon reattachment. Using a grasper the shape and pattern of the tear was determined. After this had been done an auxiliary superior portal was developed and a bone punch was placed into the greater tuberosity in an appropriate position and tapped down into the bone. A healicoil anchor was then screwed down into position. The sutures were pulled to an auxiliary portal and this process was repeated so a total of 2 anchors were placed. Using multiple suture passage devices, the sutures were separately passed from a single tape loaded anchor both anteriorly and posterior into the cuff to allow the cuff to be advanced and secured down to the tuberosity bone by passing a suture from each anchor into a lateral knotless anchor both anterior and posterior. The cuff was probed and the shoulder was rotated to check for stability of the repair. It was felt that an adequate, tight repair had been able to be obtained. The scope was then removed from the shoulder. The cannulas were removed. Interrupted monofilament sutures were placed in the portals. A Dressing was placed on the shoulder. The patient was placed in a slight abduction sling.   They were returned to the recovery room in satisfactory condition. There were no known intraoperative complications. All counts were correct. Post-operative plan:The patient was placed in a sling and will remain in this for 6 weeks. Non weight bearing until otherwise instructed. Post operative instructions are provided. I will see them back in approximately 10-12 days. At that time we will discuss need for PT or HEP. Estimated Blood Loss:  10 ml    Fluids:   see anesthesia notes. Implant:   Implant Name Type Inv.  Item Serial No.  Lot No. LRB No. Used Action   ANCHOR SUT 2.3MM Manuela Govea - [id number]  ANCHOR SUT 2.3MM Manuela Goyal [facility] ENDOSCOPY [id number] Right 1 Implanted   [facility] W/TAPE 3.4FL -- COBRAID BLUE - [id number]  [facility] W/TAPE 6.9BZ -- [facility] AND NEPH ENDOSCOPY [id number] Right 1 Implanted   [facility] W/TAPE 5.6OM -- BLUE - [id number]  [facility] W/TAPE 3.1TQ -- Camille Woodard AND NEPH ENDOSCOPY [id number] Right 1 Implanted   ANCHOR SUT MULTIFIX 5.5MM --  - [id number]  ANCHOR SUT MULTIFIX 5.5MM --   [facility] AND NEPHEW ENDOSCOPY [id number] Right 1 Implanted   ANCHOR SUT MULTIFIX 5.5MM --  - [id number]   ANCHOR SUT MULTIFIX 5.5MM --    [facility] AND NEPHEW ENDOSCOPY [id number] Right 1 Implanted       Closure: Primary    Complications: None    Signed By: Ioana Hall MD

## 2017-07-21 NOTE — ANESTHESIA PROCEDURE NOTES
Peripheral Block    Start time: 7/21/2017 2:42 PM  End time: 7/21/2017 2:46 PM  Performed by: Roberto Beckham by: Milagros Gunter: Indications: at surgeon's request and post-op pain management    Preanesthetic Checklist: patient identified, risks and benefits discussed, site marked, timeout performed, anesthesia consent given and patient being monitored    Timeout Time: 14:42          Block Type:   Block Type:   Interscalene  Laterality:  Right  Monitoring:  Standard ASA monitoring, continuous pulse ox, frequent vital sign checks, heart rate, oxygen and responsive to questions  Injection Technique:  Single shot  Procedures: ultrasound guided and nerve stimulator    Patient Position: supine  Location:  Interscalene  Needle Type:  Stimuplex  Needle Gauge:  21 G  Needle Localization:  Anatomical landmarks, nerve stimulator and ultrasound guidance  Motor Response: minimal motor response >0.4 mA    Medication Injected:  0.5%  ropivacaine  Volume (mL):  20    Assessment:  Number of attempts:  1  Injection Assessment:  Incremental injection every 5 mL, local visualized surrounding nerve on ultrasound, negative aspiration for blood, no intravascular symptoms, no paresthesia and ultrasound image on chart  Patient tolerance:  Patient tolerated the procedure well with no immediate complications

## 2017-07-21 NOTE — IP AVS SNAPSHOT
Sherman Ledezma 
 
 
 [address] 
[phone] Patient: Lisha Parents MRN: [id number] You are allergic to the following Allergen Reactions Augmentin (Amoxicillin-Pot Clavulanate) Rash Clindamycin Swelling Linezolid Other (comments) Redness Nitrofurantoin Macrocrystalline Rash Vioxx (Rofecoxib) Rash Recent Documentation Height Weight BMI OB Status Smoking Status 1.676 m 92.3 kg 32.85 kg/m2 Hysterectomy Former Smoker Emergency Contacts Name Discharge Info Relation Home Work Mobile Sonia CAREGIVER [3] Spouse [3] [phone] About your hospitalization You were admitted on:  2017 You last received care in the:  [facility] OP PACU You were discharged on:  2017 Unit phone number:  [phone] Why you were hospitalized Your primary diagnosis was:  Not on File Providers Seen During Your Hospitalizations Provider Role Specialty Primary office phone Stef Hu MD Attending Provider Orthopedic Surgery [phone] Your Primary Care Physician (PCP) Primary Care Physician Office Phone Office Fax Neva Mayogiovany [phone] Follow-up Information Follow up With Details Comments Contact Info Kyaw Alvarado MD   [address] [place] [id number] 
[phone] Current Discharge Medication List  
  
CONTINUE these medications which have CHANGED Dose & Instructions Dispensing Information Comments Morning Noon Evening Bedtime  
 estradiol 1 mg tablet Commonly known as:  ESTRACE What changed:  when to take this Your last dose was: Your next dose is:    
   
   
 Dose:  1 mg Take 1 Tab by mouth daily. Quantity:  90 Tab Refills:  3 simvastatin 20 mg tablet Commonly known as:  ZOCOR What changed:  when to take this Your last dose was: Your next dose is:    
   
   
 Dose:  20 mg Take 1 Tab by mouth nightly. Quantity:  90 Tab Refills:  3 CONTINUE these medications which have NOT CHANGED Dose & Instructions Dispensing Information Comments Morning Noon Evening Bedtime ADVAIR DISKUS 500-50 mcg/dose diskus inhaler Generic drug:  fluticasone-salmeterol Your last dose was: Your next dose is:    
   
   
 USE 1 INHALATION TWICE A DAY Quantity:  180 Each Refills:  3  
     
   
   
   
  
 conjugated estrogens 0.625 mg/gram vaginal cream  
Commonly known as:  PREMARIN Your last dose was: Your next dose is:    
   
   
 Dose:  0.5 g Insert 0.5 g into vagina daily as needed. Refills:  0  
     
   
   
   
  
 ketoconazole 2 % shampoo Commonly known as:  NIZORAL Your last dose was: Your next dose is:    
   
   
 Apply  to affected area as needed. Refills:  0  
     
   
   
   
  
 levothyroxine 112 mcg tablet Commonly known as:  SYNTHROID Your last dose was: Your next dose is:    
   
   
 Dose:  112 mcg Take 1 Tab by mouth Daily (before breakfast). Quantity:  90 Tab Refills:  3 OTHER Your last dose was: Your next dose is:    
   
   
 Riamterene Refills:  0  
     
   
   
   
  
 RITUXAN IV Your last dose was: Your next dose is:    
   
   
 by IntraVENous route. Indications: twice yearly Refills:  0 SINGULAIR PO Your last dose was: Your next dose is:    
   
   
 Dose:  20 mg Take 20 mg by mouth every morning. Refills:  0  
     
   
   
   
  
 traMADol 50 mg tablet Commonly known as:  ULTRAM  
   
Your last dose was:     
   
Your next dose is:    
   
   
 Dose:  50 mg  
 Take 50 mg by mouth every six (6) hours as needed for Pain. Refills:  0  
     
   
   
   
  
 triamterene-hydroCHLOROthiazide 37.5-25 mg per capsule Commonly known as:  Bridget Elder Your last dose was: Your next dose is:    
   
   
 Dose:  1 Cap Take 1 Cap by mouth daily. Quantity:  90 Cap Refills:  3  
     
   
   
   
  
 valACYclovir 1 gram tablet Commonly known as:  VALTREX Your last dose was: Your next dose is:    
   
   
 Dose:  1000 mg Take 1,000 mg by mouth daily (after lunch). Indications: SUPPRESSION OF RECURRENT HERPES SIMPLEX INFECTION Refills:  0 VENTOLIN HFA 90 mcg/actuation inhaler Generic drug:  albuterol Your last dose was: Your next dose is:    
   
   
 Dose:  1 Puff Take 1 Puff by inhalation every four (4) hours as needed. Indications: bring on the [facility] Refills:  0  
     
   
   
   
  
 VITAMIN D2 50,000 unit capsule Generic drug:  ergocalciferol Your last dose was: Your next dose is:    
   
   
 Dose:  23916 Units Take 50,000 Units by mouth Every Thursday. Refills:  0 Discharge Instructions ACTIVITY · As tolerated and as directed by your doctor. · Bathe or shower as directed by your doctor. DIET · Clear liquids until no nausea or vomiting; then light diet for the first day. · Advance to regular diet on second day, unless your doctor orders otherwise. · If nausea and vomiting continues, call your doctor. PAIN 
· Take pain medication as directed by your doctor. · Call your doctor if pain is NOT relieved by medication. · DO NOT take aspirin of blood thinners unless directed by your doctor. DRESSING CARE  
 
 
 
YOUR DOCTOR'S PHONE NUMBER #[phone] DISCHARGE SUMMARY from Nurse PATIENT INSTRUCTIONS: 
 
After general anesthesia or intravenous sedation, for 24 hours or while taking prescription Narcotics: · Limit your activities · Do not drive and operate hazardous machinery · Do not make important personal or business decisions · Do  not drink alcoholic beverages · If you have not urinated within 8 hours after discharge, please contact your surgeon on call. *  Please give a list of your current medications to your Primary Care Provider. *  Please update this list whenever your medications are discontinued, doses are 
    changed, or new medications (including over-the-counter products) are added. *  Please carry medication information at all times in case of emergency situations. These are general instructions for a healthy lifestyle: No smoking/ No tobacco products/ Avoid exposure to second hand smoke Surgeon General's Warning:  Quitting smoking now greatly reduces serious risk to your health. Obesity, smoking, and sedentary lifestyle greatly increases your risk for illness A healthy diet, regular physical exercise & weight monitoring are important for maintaining a healthy lifestyle You may be retaining fluid if you have a history of heart failure or if you experience any of the following symptoms:  Weight gain of 3 pounds or more overnight or 5 pounds in a week, increased swelling in our hands or feet or shortness of breath while lying flat in bed. Please call your doctor as soon as you notice any of these symptoms; do not wait until your next office visit.  
 
Recognize signs and symptoms of STROKE: 
 
 F-face looks uneven A-arms unable to move or move unevenly S-speech slurred or non-existent T-time-call 911 as soon as signs and symptoms begin-DO NOT go Back to bed or wait to see if you get better-TIME IS BRAIN. Rotator Cuff Repair Postoperative Instructions Returning Home 1. Your pain after surgery will vary depending on the method of anesthesia used and from patient to patient. In the first 24 hours, pain medication should be taken regularly with small amounts of food. During this time, nausea and light-headedness are common and should improve in 2-5 days. Drinking fluids may help. If nausea persists, medicine can be prescribed by calling your doctor at [phone]. Leaving the Outpatient Surgery Center: As you leave the surgery center, you will be in a sling and swath. The sling will have a pillow with it holding your arm away from your body slightly. Plan on wearing the sling for 4-6 weeks after surgery. Make sure that you have a large shirt or a button up shirt to wear home. For the first week: 1. Sleeping and resting will be more comfortable if you are propped up in bed or have access to a recliner. 2. Ice your shoulder to help manage the pain. 20 minutes of ice every hour as needed. 3. You may come out of the sling 3-5x/day to do elbow, wrist and hand range of motion, and other home exercises (listed further down in - Home Excersizes) so your other joints do not get stiff. Just do not activate or use your shoulder muscles! 4. Sleep with your sling on. Sling Use You will be in the sling for 4 to 6 weeks. You may take the sling off to do your home exercises or physical therapy, or shower, but you need to wear the sling at all other times, even SLEEPING. To put the sling on: 1. Make sure the pillow of the sling is snug against your side and that your hand and elbow are parallel to the floor. 2. One strap will go around your waist and connect to the pillow. 3. The other strap is one up front and two in back. The part that says DonJoy will go by the neck and the other padded part will go under your arm of the non-operated side. Care of Your Incisions 1. Incisions and stitches are often checked/removed 6 to 10 days after surgery. 2. Moderate bleeding may occur at the incision sites. This should decrease quickly over time. 3. Leave the dressings from surgery in place for 48 to 72 hours. The bulky dressings may be removed and replaced with fresh gauze at that time, but leave on the small tape strips on the incision sites. 4. Watch the wound for increasing redness, tenderness, swelling, and pus drainage daily. These can be early signs of infection. If you notice any of these signs of infection please call [phone]. A mild fever during the first few days after surgery is not uncommon. This often occurs and can be treated with deep breathing, coughing to clear the lungs, and walking. However, fevers, increasing pain, and swelling at the incisions should be reported immediately. Showering: ? Until your sutures are removed, you should consider covering your shoulder in saran wrap with tape for showering. ? A plastic chair in your shower will allow you to sit. ? Sponge bathing is also an option. ? In general, after your sutures have been removed you may allow your incisions to get wet in the shower. Post-Operative Pain Management ANESTHESIA: You will meet with an anesthesiologist on the day of surgery to discuss your anesthesia. You will have general anesthesia and often will have a nerve block. MEDICATIONS: You will be given a prescription for medications. Please take them as directed on the label and with food. ? Certain pain medications may contain Tylenol(Acetaminophen). It is important not to take any additional Tylenol while on these pain medications. ? Do not mix your pain medications with alcohol. ? You should not drive while taking pain medications as they increase your liability and delay your responses. ? Your physician will most likely talk with you about taking Aspirin 81 mg or 325 mg (ECASA) once daily for two-three weeks after surgery. This is done in order to help minimize the risk for a blood clot from developing, which is a possible complication after any surgery. If you have Ulcers or stomach irritation do not take this medicine. Be careful taking aspirin and other NSAID's as it can increase your risk of gastric irritation and other side effects. ? If you have any questions or concerns regarding your medications, please call the office. · Common side effects of the narcotics include nausea, vomiting, drowsiness, constipation, and difficulty urinating. If you experience constipation, drink lots of water/Gatorade, avoid soda and diet drinks. Eat plenty of fiber. You may take a stool softener: Colace 100mg twice a day for the first week. For severe constipation use magnesium citrate, one 8 oz bottle. All can be bought at the pharmacy. Diet and General Conditioning Aerobic conditioning and diet are both very important after surgery. In general, we recommend that you make sure to avoid skipping meals, eat a balanced diet including regular portions of fruits and vegetables, and avoid relying on fast foods while you are recovering from surgery. Also, consider taking a daily multi-vitamin. Participate in some form of aerobic conditioning after surgery. Speak with your physical therapist or call our office to determine an appropriate form of exercise after surgery. Initially, your exercise will need to be modified after surgery. Follow Up Visits Doctor Plan on seeing your surgeon at 1 week, 1 month, 3 months, and 6 months after surgery. If your shoulder does not progress as planned, you are welcome to schedule additional visits.  There is usually no charge for surgery related visits 90 days following surgery. You will receive a bill for any x-rays or special equipment (such as a brace). Home Exercises Do these exercises 3-5 times per day after surgery 1. Elbow, Wrist, and Hand Motion- have someone help you remove your sling. Let your arm dangle at your side. Using your other hand to help, bend and straighten your elbow. Also, turn your hand back and forth and move your fingers, wrist, and hand. Do this for 3-5 minutes each time that you do it. 2. Pendulums- Have someone help you remove your sling. Let your arm dangle straight at your side. Brace yourself with your non-operated arm on a table or chair. Lean forward slightly and relax your shoulder. Gravity will pull your arm away from your body. Let your arm hang and when you feel comfortable use your body to slightly swing your arm. Slowly stand up. Do this 3 times for up to 1 minute each time. DO NOT ACTIVATE YOUR SHOULDER MUSCLES. 3. Scapular Squeezes- You may leave your sling on for this exercise. Sit up straight. Roll your shoulders back and squeeze your shoulder blades together. Do this 30 times for each set of exercises that you do. 4. Scapular Shrugs- You may leave your sling on for this exercise as well. Sit up straight and do a scapular squeeze, while you hold the squeeze, shrug your shoulders. Do this 15 times for each set of exercises that you do throughout the day. [employer]                 Phone [phone] 
Netyvonne Dacosta                 Fax [phone] Lily Ramirez 70 Discharge Orders None ACO Transitions of Care Introducing [employer] [address] Thomas offers a voluntary care coordination program to provide high quality service and care to [facility] fee-for-service beneficiaries.   
 
Bunny Goldman was designed to help you enhance your health and well-being through the following services: ? Transitions of Care  support for individuals who are transitioning from one care setting to another (example: Hospital to home). ? Chronic and Complex Care Coordination  support for individuals and caregivers of those with serious or chronic illnesses or with more than one chronic (ongoing) condition and those who take a number of different medications. If you meet specific medical criteria, a [address] [place] [address] Rd may call you directly to coordinate your care with your primary care physician and your other care providers. For questions about the [facility] programs, please, contact your physicians office. For general questions or additional information about Accountable Care Organizations: 
Please visit www.medicare.gov/acos. html or call 1-800-MEDICARE (6-224.667.5900) TTY users should call 0-829.365.7950. Introducing [facility]! Dear Georgina Mcgrath: 
Thank you for requesting a [employer] account. Our records indicate that you already have an active [employer] account. You can access your account anytime at [email or web address] Did you know that you can access your hospital and ER discharge instructions at any time in [employer]? You can also review all of your test results from your hospital stay or ER visit. Additional Information If you have questions, please visit the Frequently Asked Questions section of the [employer] website at [email or web address] Remember, [employer] is NOT to be used for urgent needs. For medical emergencies, dial 911. Now available from your iPhone and Android! General Information Please provide this summary of care documentation to your next provider. Patient Signature:  ____________________________________________________________  Date:  ____________________________________________________________  
  
Rosanna Quiros    
    
 Provider Signature:  ____________________________________________________________ Date:  ____________________________________________________________

## 2017-07-21 NOTE — BRIEF OP NOTE
BRIEF OPERATIVE NOTE    Date of Procedure: 7/21/2017   Preoperative Diagnosis: Incomplete tear of right rotator cuff [M75.111]  Biceps tendinosis of right shoulder [M75.21]  Postoperative Diagnosis: Incomplete tear of right rotator cuff [M75.111]  Biceps tendinosis of right shoulder [M75.21]    Procedure(s):  RIGHT SHOULDER ARTHROSCOPY WITH ROTATOR CUFF REPAIR   Surgeon(s) and Role:     * Erma Hubbard MD - Primary         Assistant Staff:       Surgical Staff:  Circ-1: Mati Luu RN  Scrub Tech-1: No Rico  Scrub Private/Assistant: Benny Bal  Event Time In   Incision Start 1557   Incision Close 1657     Anesthesia: General   Estimated Blood Loss: 10 ml  Specimens: * No specimens in log *   Findings: right rotator cuff tear of subscapularis and supraspinatus. Complications: none  Implants:   Implant Name Type Inv.  Item Serial No.  Lot No. LRB No. Used Action   ANCHOR SUT 2.3MM Hazle Haff Marcelyn Koyanagi - [id number]  ANCHOR SUT 2.3MM [employer]le Elaina Galvan ENDOSCOPY [id number] Right 1 Implanted   [facility] W/TAPE 7.7QE -- [employer] BLUE - [id number]  [facility] W/TAPE 1.4PY -- Johnna Sharma [employer] ENDOSCOPY [id number] Right 1 Implanted   [facility] W/TAPE 1.9US -- BLUE - [id number]  [facility] W/TAPE 0.1YZ -- Pop Thompson [employer] ENDOSCOPY [id number] Right 1 Implanted   ANCHOR SUT MULTIFIX 5.5MM --  - [id number]  ANCHOR SUT MULTIFIX 5.5MM --   [facility] AND [employer] ENDOSCOPY [id number] Right 1 Implanted   ANCHOR SUT MULTIFIX 5.5MM --  - [id number]   ANCHOR SUT MULTIFIX 5.5MM --    [facility] [employer] ENDOSCOPY [id number] Right 1 Implanted

## 2017-07-21 NOTE — ANESTHESIA POSTPROCEDURE EVALUATION
Post-Anesthesia Evaluation and Assessment    Patient: Jonatan Yin MRN: [id number]  SSN: [id number]    YOB: 1950  Age: 73 y.o. Sex: female       Cardiovascular Function/Vital Signs  Visit Vitals    /65    Pulse 96    Temp 36.4 °C (97.5 °F)    Resp 16    Ht 5' 6\" (1.676 m)    Wt 92.3 kg (203 lb 8 oz)    SpO2 95%    BMI 32.85 kg/m2       Patient is status post general anesthesia for Procedure(s):  RIGHT SHOULDER ARTHROSCOPY WITH ROTATOR CUFF REPAIR . Nausea/Vomiting: None    Postoperative hydration reviewed and adequate. Pain:  Pain Scale 1: Numeric (0 - 10) (07/21/17 1349)  Pain Intensity 1: 0 (07/21/17 1501)   Managed    Neurological Status:   Neuro (WDL): Within Defined Limits (07/21/17 1352)   At baseline    Mental Status and Level of Consciousness: Arousable    Pulmonary Status:   O2 Device: Nasal cannula (07/21/17 5347)   Adequate oxygenation and airway patent    Complications related to anesthesia: None    Post-anesthesia assessment completed.  No concerns    Signed By: Leslie Domínguez MD     July 21, 2017

## 2017-12-21 ENCOUNTER — HOSPITAL ENCOUNTER (OUTPATIENT)
Dept: CT IMAGING | Age: 67
Discharge: HOME | End: 2017-12-21
Attending: FAMILY MEDICINE
Payer: MEDICARE

## 2017-12-21 DIAGNOSIS — R10.32 LEFT LOWER QUADRANT PAIN: ICD-10-CM

## 2017-12-21 PROCEDURE — 74177 CT ABD & PELVIS W/CONTRAST: CPT

## 2017-12-21 PROCEDURE — 74011000258 HC RX REV CODE- 258: Performed by: FAMILY MEDICINE

## 2017-12-21 PROCEDURE — 74011636320 HC RX REV CODE- 636/320: Performed by: FAMILY MEDICINE

## 2017-12-21 RX ORDER — SODIUM CHLORIDE 0.9 % (FLUSH) 0.9 %
10 SYRINGE (ML) INJECTION
Status: ACTIVE | OUTPATIENT
Start: 2017-12-21 | End: 2017-12-22

## 2017-12-21 RX ADMIN — IOPAMIDOL 100 ML: 755 INJECTION, SOLUTION INTRAVENOUS at 13:11

## 2017-12-21 RX ADMIN — SODIUM CHLORIDE 100 ML: 900 INJECTION, SOLUTION INTRAVENOUS at 13:11

## 2017-12-21 RX ADMIN — DIATRIZOATE MEGLUMINE AND DIATRIZOATE SODIUM 15 ML: 660; 100 LIQUID ORAL; RECTAL at 13:11

## 2018-01-12 ENCOUNTER — APPOINTMENT (RX ONLY)
Dept: URBAN - METROPOLITAN AREA CLINIC 24 | Facility: CLINIC | Age: 68
Setting detail: DERMATOLOGY
End: 2018-01-12

## 2018-01-12 DIAGNOSIS — L57.0 ACTINIC KERATOSIS: ICD-10-CM

## 2018-01-12 DIAGNOSIS — D22 MELANOCYTIC NEVI: ICD-10-CM

## 2018-01-12 DIAGNOSIS — L663 OTHER SPECIFIED DISEASES OF HAIR AND HAIR FOLLICLES: ICD-10-CM

## 2018-01-12 DIAGNOSIS — L81.4 OTHER MELANIN HYPERPIGMENTATION: ICD-10-CM

## 2018-01-12 DIAGNOSIS — L73.9 FOLLICULAR DISORDER, UNSPECIFIED: ICD-10-CM

## 2018-01-12 DIAGNOSIS — L82.1 OTHER SEBORRHEIC KERATOSIS: ICD-10-CM

## 2018-01-12 DIAGNOSIS — L738 OTHER SPECIFIED DISEASES OF HAIR AND HAIR FOLLICLES: ICD-10-CM

## 2018-01-12 PROBLEM — L55.1 SUNBURN OF SECOND DEGREE: Status: ACTIVE | Noted: 2018-01-12

## 2018-01-12 PROBLEM — D22.5 MELANOCYTIC NEVI OF TRUNK: Status: ACTIVE | Noted: 2018-01-12

## 2018-01-12 PROBLEM — H91.90 UNSPECIFIED HEARING LOSS, UNSPECIFIED EAR: Status: ACTIVE | Noted: 2018-01-12

## 2018-01-12 PROBLEM — L02.02 FURUNCLE OF FACE: Status: ACTIVE | Noted: 2018-01-12

## 2018-01-12 PROBLEM — J45.909 UNSPECIFIED ASTHMA, UNCOMPLICATED: Status: ACTIVE | Noted: 2018-01-12

## 2018-01-12 PROCEDURE — 99213 OFFICE O/P EST LOW 20 MIN: CPT

## 2018-01-12 PROCEDURE — ? TREATMENT REGIMEN

## 2018-01-12 PROCEDURE — ? COUNSELING

## 2018-01-12 PROCEDURE — ? PRESCRIPTION

## 2018-01-12 RX ORDER — FLUOROURACIL 2 G/40G
CREAM TOPICAL
Qty: 1 | Refills: 3 | Status: ERX

## 2018-01-12 RX ORDER — CLINDAMYCIN PHOSPHATE 10 MG/ML
LOTION TOPICAL
Qty: 1 | Refills: 3 | Status: ACTIVE

## 2018-01-12 RX ORDER — CALCIPOTRIENE 50 UG/G
CREAM TOPICAL
Qty: 1 | Refills: 1 | Status: ERX | COMMUNITY
Start: 2018-01-12

## 2018-01-12 RX ADMIN — CALCIPOTRIENE: 50 CREAM TOPICAL at 15:02

## 2018-01-12 ASSESSMENT — LOCATION SIMPLE DESCRIPTION DERM
LOCATION SIMPLE: LEFT FOREARM
LOCATION SIMPLE: RIGHT PRETIBIAL REGION
LOCATION SIMPLE: RIGHT TEMPLE
LOCATION SIMPLE: ABDOMEN
LOCATION SIMPLE: RIGHT PRETIBIAL REGION
LOCATION SIMPLE: RIGHT UPPER BACK
LOCATION SIMPLE: LEFT UPPER BACK
LOCATION SIMPLE: RIGHT FOREARM
LOCATION SIMPLE: LEFT PRETIBIAL REGION
LOCATION SIMPLE: LEFT FOREARM
LOCATION SIMPLE: CHEST
LOCATION SIMPLE: LEFT PRETIBIAL REGION
LOCATION SIMPLE: RIGHT FOREARM

## 2018-01-12 ASSESSMENT — LOCATION ZONE DERM
LOCATION ZONE: LEG
LOCATION ZONE: ARM
LOCATION ZONE: FACE
LOCATION ZONE: LEG
LOCATION ZONE: TRUNK
LOCATION ZONE: ARM

## 2018-01-12 ASSESSMENT — LOCATION DETAILED DESCRIPTION DERM
LOCATION DETAILED: LEFT DISTAL DORSAL FOREARM
LOCATION DETAILED: LEFT PROXIMAL PRETIBIAL REGION
LOCATION DETAILED: LEFT PROXIMAL DORSAL FOREARM
LOCATION DETAILED: PERIUMBILICAL SKIN
LOCATION DETAILED: LEFT INFERIOR UPPER BACK
LOCATION DETAILED: RIGHT MID TEMPLE
LOCATION DETAILED: RIGHT PROXIMAL RADIAL DORSAL FOREARM
LOCATION DETAILED: RIGHT PROXIMAL PRETIBIAL REGION
LOCATION DETAILED: RIGHT DISTAL DORSAL FOREARM
LOCATION DETAILED: LEFT MEDIAL INFERIOR CHEST
LOCATION DETAILED: RIGHT PROXIMAL DORSAL FOREARM
LOCATION DETAILED: RIGHT MID-UPPER BACK
LOCATION DETAILED: LEFT PROXIMAL PRETIBIAL REGION
LOCATION DETAILED: LEFT PROXIMAL DORSAL FOREARM
LOCATION DETAILED: RIGHT PROXIMAL PRETIBIAL REGION

## 2018-02-23 ENCOUNTER — HOSPITAL ENCOUNTER (OUTPATIENT)
Dept: PHYSICAL THERAPY | Age: 68
Discharge: HOME | End: 2018-02-23
Attending: OBSTETRICS & GYNECOLOGY
Payer: MEDICARE

## 2018-02-23 DIAGNOSIS — N94.19 DYSPAREUNIA DUE TO MEDICAL CONDITION IN FEMALE: ICD-10-CM

## 2018-02-23 PROCEDURE — 97110 THERAPEUTIC EXERCISES: CPT

## 2018-02-23 PROCEDURE — G8988 SELF CARE GOAL STATUS: HCPCS

## 2018-02-23 PROCEDURE — G8987 SELF CARE CURRENT STATUS: HCPCS

## 2018-02-23 PROCEDURE — 97161 PT EVAL LOW COMPLEX 20 MIN: CPT

## 2018-02-23 NOTE — THERAPY EVALUATION
Lieutenant Oppenheim  : 1950  [place] / Product Type: Medicare /  [address] [place] [address]  at [facility] [address]  [phone]   [phone]          OUTPATIENT PHYSICAL THERAPY:Initial Assessment and Daily Note 2018    ICD-10: Treatment Diagnosis: spasm of muscle (M62.838); lack of coordination (R27.8); dyspareunia (N94.1)  Precautions/Allergies:   Augmentin [amoxicillin-pot clavulanate]; Clindamycin; Linezolid; Nitrofurantoin macrocrystalline; and Vioxx [rofecoxib]   Fall Risk Score: 0 (? 5 = High Risk)  MD Orders: Evaluate and treat MEDICAL/REFERRING DIAGNOSIS:  Dyspareunia due to medical condition in female [N94.19]   DATE OF ONSET: ~ 8 months ago  REFERRING PHYSICIAN: Loly Vásquez MD  RETURN PHYSICIAN APPOINTMENT: TBD     INITIAL ASSESSMENT:  Ms. Yara Rodriguez presents with moderate pelvic floor muscle (PFM) over activity and lack of coordination, resulting in pain during intercourse. She also demonstrates weakness throughout her core musculature and pelvic girdle, as well as tightness in her proximal lower extremities. This is contributing to her difficulty with pain free penile penetration. I believe she will benefit from skilled PT, with an emphasis on manual therapy and muscle retraining, strength, and coordination to improve her ability to perform sexual activity without pain or difficulty. She is pleasant, motivated and eager to return to her prior level of function (PLOF). PROBLEM LIST (Impacting functional limitations):  1. Decreased Strength  2. Increased Pain  3. Decreased Flexibility/Joint Mobility  4. Decreased [place] with Home Exercise Program INTERVENTIONS PLANNED:  1. Electrical Stimulation  2. Heat  3. Home Exercise Program (HEP)  4. Manual Therapy  5. Therapeutic Exercise/Strengthening   TREATMENT PLAN:  Effective Dates: 2018 TO 2018.   Frequency/Duration: 1 time a week for 8 weeks    GOALS: (Goals have been discussed and agreed upon with patient.)    Short-Term Functional Goals: Time Frame: 4 weeks  Pt will demonstrate I with basic PFM HEP to improve awareness, coordination, and timing of PFM. Discharge Goals: Time Frame: 8 weeks  1. Pt with demonstrate normal voluntary relaxation of the pelvic floor muscle group to improve pelvic floor ROM and tolerate pain free penile penetration. 2. Pt will tolerate pain free 1 finger examination of PFM to transition to pain free intercourse in various positions. 3. Pt will report 0/10 pain with intercourse to return to her PLOF. Rehabilitation Potential For Stated Goals: Good    Regarding Nakita Springer therapy, I certify that the treatment plan above will be carried out by a therapist or under their direction. Thank you for this referral,  Katarina Aguayo PT       Referring Physician Signature: Paulette Jonas MD              Date                    The information in this section was collected on 2/23/2018   (except where otherwise noted). HISTORY:   History of Present Injury/Illness (Reason for Referral):         Pt diagnosed with BV ~ 8 months ago, and then gradually started to notice symptoms (which have progressively worsened). Urinary: No complaints at this time. History of bladder suspension, which resolved the symptoms she was having in 1994. Bowel: Daily, with occasional pushing or straining. Sexual: 10/10 pain reported with attempts at full penile penetration. Described as \"something clamping shut\", \"hitting a wall\" and tearing or ripping. Marinoff 2 at this time. Pelvic Organ Prolapse/Pelvic Pain: Denies. Past Medical History/Comorbidities:   Ms. Gail Montoya  has a past medical history of Asthma; Cervical spondylosis with radiculopathy (2/21/2017); Chronic pain; GERD (gastroesophageal reflux disease); Hyperlipidemia; Hypertension; Hypothyroid; Lupus; Rheumatoid arthritis ([address]  Rosacea; and Vitamin D deficiency. Ms. Nyla Dang  has a past surgical history that includes hx appendectomy; hx bladder suspension; hx endoscopy (2012); hx cervical fusion (2017); hx colonoscopy (2012); hx salpingo-oophorectomy (Bilateral, 1996); hx knee arthroscopy (Right); hx orthopaedic (Left); hx other surgical; and hx total abdominal hysterectomy (1994). Social History/Living Environment:    Lives with spouse and 2 dogs. Prior Level of Function/Work/Activity:  Retired; Functionally I at this time. Current Medications:       Current Outpatient Prescriptions:     estradiol (ESTRACE) 0.01 % (0.1 mg/gram) vaginal cream, Insert 0.5 g into vagina daily. , Disp: 42.5 g, Rfl: 3    ciprofloxacin HCl (CIPRO) 500 mg tablet, Take 1 Tab by mouth two (2) times a day., Disp: 14 Tab, Rfl: 0    estradiol (ESTRACE) 1 mg tablet, Take 1 Tab by mouth daily. , Disp: 90 Tab, Rfl: 3    clobetasol (TEMOVATE) 0.05 % ointment, Apply  to affected area two (2) times a day., Disp: 15 g, Rfl: 0    ADVAIR DISKUS 500-50 mcg/dose diskus inhaler, USE 1 INHALATION TWICE A DAY, Disp: 180 Each, Rfl: 3    triamterene-hydroCHLOROthiazide (DYAZIDE) 37.5-25 mg per capsule, Take 1 Cap by mouth daily. , Disp: 90 Cap, Rfl: 3    levothyroxine (SYNTHROID) 112 mcg tablet, Take 1 Tab by mouth Daily (before breakfast). , Disp: 90 Tab, Rfl: 3    simvastatin (ZOCOR) 20 mg tablet, Take 1 Tab by mouth nightly. (Patient taking differently: Take 20 mg by mouth every morning.), Disp: 90 Tab, Rfl: 3    MONTELUKAST SODIUM (SINGULAIR PO), Take 20 mg by mouth every morning., Disp: , Rfl:     albuterol (VENTOLIN HFA) 90 mcg/actuation inhaler, Take 1 Puff by inhalation every four (4) hours as needed. Indications: bring on the dos, Disp: , Rfl:     RITUXIMAB (RITUXAN IV), by IntraVENous route. Indications: twice yearly, Disp: , Rfl:     conjugated estrogens (PREMARIN) 0.625 mg/gram vaginal cream, Insert 0.5 g into vagina daily as needed. , Disp: , Rfl:     ketoconazole (NIZORAL) 2 % shampoo, Apply  to affected area as needed. , Disp: , Rfl:    Date Last Reviewed:  2/23/2018   Number of Personal Factors/Comorbidities that affect the Plan of Care: 0: LOW COMPLEXITY   EXAMINATION:   Palpation:          Via vaginal canal: L superficial transverse perineal tender to palpation (TTP) with tearing. R pubococcygeus, B iliococcygeus and B obturator internus (OI) TTP with wincing and reproduction of              dyspareunia. Adductors: moderate connective tissue (CT) restrictions with pain and wincing proximally. Strength:  Via vaginal canal        P: Power, E: Endurance, R: Repetitions, QF: Quick Flicks, TrA: Transverse Abdominus, DB: Diaphragmatic Breathing  P 2/5, though  Unable to fully assess due to overactivity. Moderate delay in relaxation   E NT due to active pain in PFM   R NT due to active pain in PFM   QF NT due to active pain in PFM   TrA Unable to coordinate; bulging noted   DB Normal abdominal expansion, but no PFM descent        Body Structures Involved:  1. Muscles Body Functions Affected:  1. Neuromusculoskeletal  2. Movement Related Activities and Participation Affected:  1. Self Care  2. Interpersonal Interactions and Relationships   Number of elements (examined above) that affect the Plan of Care: 3: MODERATE COMPLEXITY   CLINICAL PRESENTATION:   Presentation: Stable and uncomplicated: LOW COMPLEXITY   CLINICAL DECISION MAKING:   Outcome Measure: Tool Used: Pain Disability Index  Score:  Initial: 2/23/2018 (10/70- but 10/10 for sexual activity) Most Recent: TBD   Interpretation of Score: The rating scale measures the degree to which aspects of your life are disrupted by chronic pain. For each of the 7 categories, patient circles the level of disability they experience 0 to 10. 0 signifies no disability at all, 10 signifies that these activities are totally disrupted by pain. ?   Self Care:     - CURRENT STATUS: CJ - 20%-39% impaired, limited or restricted    - GOAL STATUS: CH - 0% impaired, limited or restricted    - D/C STATUS:  ---------------To be determined---------------    Medical Necessity:   · Patient is expected to demonstrate progress in range of motion, coordination and functional technique to eliminate pain during intercourse and return to painfree sexual activity. Reason for Services/Other Comments:  · Patient continues to require skilled intervention due to above mentioned deficits. Use of outcome tool(s) and clinical judgement create a POC that gives a: Clear prediction of patient's progress: LOW COMPLEXITY            TREATMENT:   (In addition to Assessment/Re-Assessment sessions the following treatments were rendered)    Pre-treatment Symptoms/Complaints:  Pt is eager to start therapy and return to pain free intercourse with her . Pain: Initial:   Pain Intensity 1: 0/10 Post Session:  0/10     THERAPEUTIC EXERCISE: (15 minutes):  Exercises per grid below to improve mobility, strength and coordination. Required moderate verbal and tactile cues to promote N PFM function. Progressed resistance, range and complexity of movement as indicated. Date:  2/23/2018     Activity/Exercise Parameters   Pelvic Floor Drops 5 minutes   Diaphragmatic Breathing 5 minutes   Adductor Stretch 2 minutes   Estrogen Application 3 minutes   HEP Drops, Adductor Stretch, 2-3 x daily     Treatment/Session Assessment:    · Response to Treatment:  Pt is a good candidate for PT and will benefit from manual therapy, biofeedback, and gentle movement exercises to eliminate pain. · Compliance with Program/Exercises: Will assess as treatment progresses. · Recommendations/Intent for next treatment session: \"Next visit will focus on semg, internal, external, and initiate dilator training\".     Total Treatment Duration: Evaluation 45 minutes, Treatment 15 minutes  PT Patient Time In/Time Out  Time In: 1000  Time Out: [facility], PT

## 2018-02-27 ENCOUNTER — HOSPITAL ENCOUNTER (OUTPATIENT)
Dept: PHYSICAL THERAPY | Age: 68
Discharge: HOME | End: 2018-02-27
Attending: OBSTETRICS & GYNECOLOGY
Payer: MEDICARE

## 2018-02-27 PROCEDURE — 97110 THERAPEUTIC EXERCISES: CPT

## 2018-02-27 PROCEDURE — 97140 MANUAL THERAPY 1/> REGIONS: CPT

## 2018-02-27 NOTE — PROGRESS NOTES
Samantha Wiggins  : 1950  [place] / Product Type: Medicare /  [address]  at [address] [place] [address]  [phone]   [phone]          OUTPATIENT PHYSICAL THERAPY:Daily Note 2018    ICD-10: Treatment Diagnosis: spasm of muscle (M62.838); lack of coordination (R27.8); dyspareunia (N94.1)  Precautions/Allergies:   Augmentin [amoxicillin-pot clavulanate]; Clindamycin; Linezolid; Nitrofurantoin macrocrystalline; and Vioxx [rofecoxib]   Fall Risk Score: 0 (? 5 = High Risk)  MD Orders: Evaluate and treat MEDICAL/REFERRING DIAGNOSIS:  Other specified dyspareunia [N94.19]   DATE OF ONSET: ~ 8 months ago  REFERRING PHYSICIAN: Cyn Marques MD  RETURN PHYSICIAN APPOINTMENT: TBD     INITIAL ASSESSMENT:  Ms. Elena Monk presents with moderate pelvic floor muscle (PFM) over activity and lack of coordination, resulting in pain during intercourse. She also demonstrates weakness throughout her core musculature and pelvic girdle, as well as tightness in her proximal lower extremities. This is contributing to her difficulty with pain free penile penetration. I believe she will benefit from skilled PT, with an emphasis on manual therapy and muscle retraining, strength, and coordination to improve her ability to perform sexual activity without pain or difficulty. She is pleasant, motivated and eager to return to her prior level of function (PLOF). PROBLEM LIST (Impacting functional limitations):  1. Decreased Strength  2. Increased Pain  3. Decreased Flexibility/Joint Mobility  4. Decreased [place] with Home Exercise Program INTERVENTIONS PLANNED:  1. Electrical Stimulation  2. Heat  3. Home Exercise Program (HEP)  4. Manual Therapy  5. Therapeutic Exercise/Strengthening   TREATMENT PLAN:  Effective Dates: 2018 TO 2018.   Frequency/Duration: 1 time a week for 8 weeks    GOALS: (Goals have been discussed and agreed upon with patient.)    Short-Term Functional Goals: Time Frame: 4 weeks  Pt will demonstrate I with basic PFM HEP to improve awareness, coordination, and timing of PFM. Discharge Goals: Time Frame: 8 weeks  1. Pt with demonstrate normal voluntary relaxation of the pelvic floor muscle group to improve pelvic floor ROM and tolerate pain free penile penetration. 2. Pt will tolerate pain free 1 finger examination of PFM to transition to pain free intercourse in various positions. 3. Pt will report 0/10 pain with intercourse to return to her PLOF. Rehabilitation Potential For Stated Goals: Good    Regarding Thresa Ridgel therapy, I certify that the treatment plan above will be carried out by a therapist or under their direction. Thank you for this referral,  Fatimah Lovelace PT       Referring Physician Signature: Yuki Aden MD              Date                    The information in this section was collected on 2/27/2018   (except where otherwise noted). HISTORY:   History of Present Injury/Illness (Reason for Referral):         Pt diagnosed with BV ~ 8 months ago, and then gradually started to notice symptoms (which have progressively worsened). Urinary: No complaints at this time. History of bladder suspension, which resolved the symptoms she was having in 1994. Bowel: Daily, with occasional pushing or straining. Sexual: 10/10 pain reported with attempts at full penile penetration. Described as \"something clamping shut\", \"hitting a wall\" and tearing or ripping. Mar[place]ff 2 at this time. Pelvic Organ Prolapse/Pelvic Pain: Denies. Past Medical History/Comorbidities:   Ms. Bee Medellin  has a past medical history of Asthma; Cervical spondylosis with radiculopathy (2/21/2017); Chronic pain; GERD (gastroesophageal reflux disease); Hyperlipidemia; Hypertension; Hypothyroid; Lupus; Rheumatoid arthritis ([facility] [address] Rosacea; and Vitamin D deficiency.   Ms. Bee Medellin  has a past surgical history that includes hx appendectomy; hx bladder suspension; hx endoscopy (2012); hx cervical fusion (2017); hx colonoscopy (2012); hx salpingo-oophorectomy (Bilateral, 1996); hx knee arthroscopy (Right); hx orthopaedic (Left); hx other surgical; and hx total abdominal hysterectomy (1994). Social History/Living Environment:    Lives with spouse and 2 dogs. Prior Level of Function/Work/Activity:  Retired; Functionally I at this time. Current Medications:       Current Outpatient Prescriptions:     estradiol (ESTRACE) 0.01 % (0.1 mg/gram) vaginal cream, Insert 0.5 g into vagina daily. , Disp: 42.5 g, Rfl: 3    ciprofloxacin HCl (CIPRO) 500 mg tablet, Take 1 Tab by mouth two (2) times a day., Disp: 14 Tab, Rfl: 0    estradiol (ESTRACE) 1 mg tablet, Take 1 Tab by mouth daily. , Disp: 90 Tab, Rfl: 3    clobetasol (TEMOVATE) 0.05 % ointment, Apply  to affected area two (2) times a day., Disp: 15 g, Rfl: 0    ADVAIR DISKUS 500-50 mcg/dose diskus inhaler, USE 1 INHALATION TWICE A DAY, Disp: 180 Each, Rfl: 3    triamterene-hydroCHLOROthiazide (DYAZIDE) 37.5-25 mg per capsule, Take 1 Cap by mouth daily. , Disp: 90 Cap, Rfl: 3    levothyroxine (SYNTHROID) 112 mcg tablet, Take 1 Tab by mouth Daily (before breakfast). , Disp: 90 Tab, Rfl: 3    simvastatin (ZOCOR) 20 mg tablet, Take 1 Tab by mouth nightly. (Patient taking differently: Take 20 mg by mouth every morning.), Disp: 90 Tab, Rfl: 3    MONTELUKAST SODIUM (SINGULAIR PO), Take 20 mg by mouth every morning., Disp: , Rfl:     albuterol (VENTOLIN HFA) 90 mcg/actuation inhaler, Take 1 Puff by inhalation every four (4) hours as needed. Indications: bring on the dos, Disp: , Rfl:     RITUXIMAB (RITUXAN IV), by IntraVENous route. Indications: twice yearly, Disp: , Rfl:     conjugated estrogens (PREMARIN) 0.625 mg/gram vaginal cream, Insert 0.5 g into vagina daily as needed. , Disp: , Rfl:     ketoconazole (NIZORAL) 2 % shampoo, Apply  to affected area as needed. , Disp: , Rfl:    Date Last Reviewed:  2/27/2018   Number of Personal Factors/Comorbidities that affect the Plan of Care: 0: LOW COMPLEXITY   EXAMINATION:   Palpation:          Via vaginal canal: L superficial transverse perineal tender to palpation (TTP) with tearing. R pubococcygeus, B iliococcygeus and B obturator internus (OI) TTP with wincing and reproduction of              dyspareunia. Adductors: moderate connective tissue (CT) restrictions with pain and wincing proximally. Strength:  Via vaginal canal        P: Power, E: Endurance, R: Repetitions, QF: Quick Flicks, TrA: Transverse Abdominus, DB: Diaphragmatic Breathing  P 2/5, though  Unable to fully assess due to overactivity. Moderate delay in relaxation   E NT due to active pain in PFM   R NT due to active pain in PFM   QF NT due to active pain in PFM   TrA Unable to coordinate; bulging noted   DB Normal abdominal expansion, but no PFM descent        Body Structures Involved:  1. Muscles Body Functions Affected:  1. Neuromusculoskeletal  2. Movement Related Activities and Participation Affected:  1. Self Care  2. Interpersonal Interactions and Relationships   Number of elements (examined above) that affect the Plan of Care: 3: MODERATE COMPLEXITY   CLINICAL PRESENTATION:   Presentation: Stable and uncomplicated: LOW COMPLEXITY   CLINICAL DECISION MAKING:   Outcome Measure: Tool Used: Pain Disability Index  Score:  Initial: 2/23/2018 (10/70- but 10/10 for sexual activity) Most Recent: TBD   Interpretation of Score: The rating scale measures the degree to which aspects of your life are disrupted by chronic pain. For each of the 7 categories, patient circles the level of disability they experience 0 to 10. 0 signifies no disability at all, 10 signifies that these activities are totally disrupted by pain. ?   Self Care:     - CURRENT STATUS: CJ - 20%-39% impaired, limited or restricted    - GOAL STATUS: CH - 0% impaired, limited or restricted    - D/C STATUS:  ---------------To be determined---------------    Medical Necessity:   · Patient is expected to demonstrate progress in range of motion, coordination and functional technique to eliminate pain during intercourse and return to painfree sexual activity. Reason for Services/Other Comments:  · Patient continues to require skilled intervention due to above mentioned deficits. Use of outcome tool(s) and clinical judgement create a POC that gives a: Clear prediction of patient's progress: LOW COMPLEXITY            TREATMENT:   (In addition to Assessment/Re-Assessment sessions the following treatments were rendered)    Pre-treatment Symptoms/Complaints:  Been doing drops and thigh stretching daily. Had intercourse and it \"was not good\". Pain: Initial:   Pain Intensity 1: 0/10 Post Session:  0/10     THERAPEUTIC EXERCISE: (12 minutes):  Exercises per grid below to improve mobility, strength and coordination. Required moderate verbal and tactile cues to promote N PFM function. Progressed resistance, range and complexity of movement as indicated. Date:  2/27/2018     Activity/Exercise Parameters   Pelvic Floor Drops Supine, w/SEMG; 5 minutes   Diaphragmatic Breathing 5 minutes   Adductor Stretch    HEP Drops, Adductor Stretch, Add Cat/Camel 2-3 x daily; 2 minutes     MANUAL THERAPY: (30 minutes): Soft tissue mobilization was utilized and necessary because of the patient's painful spasm and restricted motion of soft tissue.   (Used abbreviations: MET - muscle energy technique; SCS- Strain counter strain; CTM- Connective tissue mobilizations; CR- Contract/relax; SP- Sustained pressure, TrP- Trigger point release, IASTM- Instrument assisted soft tissue mobilizations, TDN- Trigger point dry needling)  - CTM to adductors and ischiorectal fossa  - 1 finger MT to superficial and deep PFM (to include SCS, CR, and SP)  - Medium vaginismus dilator      Treatment/Session Assessment: · Response to Treatment:  Resting tone slightly elevated in supine (3 mV), but with drops and breathing this improved. Superficial PFM nontender this date, however levator ani reproducing pain. Again, improved with MT. Initiated dilator training and tolerated med. Vaginismus dilator without pain. · Compliance with Program/Exercises: Will assess as treatment progresses. · Recommendations/Intent for next treatment session: \"Next visit will focus on internal, external, and med/large dilator training\".     Total Treatment Duration: 42 minutes, 5 minutes roxann/doff clothing  PT Patient Time In/Time Out  Time In: 1015  Time Out: [id number], PT

## 2018-03-08 ENCOUNTER — HOSPITAL ENCOUNTER (OUTPATIENT)
Dept: PHYSICAL THERAPY | Age: 68
Discharge: HOME | End: 2018-03-08
Attending: OBSTETRICS & GYNECOLOGY
Payer: MEDICARE

## 2018-03-08 PROCEDURE — 97110 THERAPEUTIC EXERCISES: CPT

## 2018-03-08 PROCEDURE — 97140 MANUAL THERAPY 1/> REGIONS: CPT

## 2018-03-08 NOTE — PROGRESS NOTES
Maria Trevino  : 1950  [place] / [place] / Product Type: Medicare /  [address]  at [facility]  [phone]   [phone]          OUTPATIENT PHYSICAL THERAPY:Daily Note 3/8/2018    ICD-10: Treatment Diagnosis: spasm of muscle (M62.838); lack of coordination (R27.8); dyspareunia (N94.1)  Precautions/Allergies:   Augmentin [amoxicillin-pot clavulanate]; Clindamycin; Linezolid; Nitrofurantoin macrocrystalline; and Vioxx [rofecoxib]   Fall Risk Score: 0 (? 5 = High Risk)  MD Orders: Evaluate and treat MEDICAL/REFERRING DIAGNOSIS:  Other specified dyspareunia [N94.19]   DATE OF ONSET: ~ 8 months ago  REFERRING PHYSICIAN: Nii Rodriguez MD  RETURN PHYSICIAN APPOINTMENT: TBD     INITIAL ASSESSMENT:  Ms. Catrachita Montague presents with moderate pelvic floor muscle (PFM) over activity and lack of coordination, resulting in pain during intercourse. She also demonstrates weakness throughout her core musculature and pelvic girdle, as well as tightness in her proximal lower extremities. This is contributing to her difficulty with pain free penile penetration. I believe she will benefit from skilled PT, with an emphasis on manual therapy and muscle retraining, strength, and coordination to improve her ability to perform sexual activity without pain or difficulty. She is pleasant, motivated and eager to return to her prior level of function (PLOF). PROBLEM LIST (Impacting functional limitations):  1. Decreased Strength  2. Increased Pain  3. Decreased Flexibility/Joint Mobility  4. Decreased [place] with Home Exercise Program INTERVENTIONS PLANNED:  1. Electrical Stimulation  2. Heat  3. Home Exercise Program (HEP)  4. Manual Therapy  5. Therapeutic Exercise/Strengthening   TREATMENT PLAN:  Effective Dates: 3/8/2018 TO 2018.   Frequency/Duration: 1 time a week for 8 weeks    GOALS: (Goals have been discussed and agreed upon with patient.)    Short-Term Functional Goals: Time Frame: 4 weeks  Pt will demonstrate I with basic PFM HEP to improve awareness, coordination, and timing of PFM. (MET 3/8/2018)    Discharge Goals: Time Frame: 8 weeks  1. Pt with demonstrate normal voluntary relaxation of the pelvic floor muscle group to improve pelvic floor ROM and tolerate pain free penile penetration. 2. Pt will tolerate pain free 1 finger examination of PFM to transition to pain free intercourse in various positions. 3. Pt will report 0/10 pain with intercourse to return to her PLOF. Rehabilitation Potential For Stated Goals: Good                The information in this section was collected on 3/8/2018   (except where otherwise noted). HISTORY:   History of Present Injury/Illness (Reason for Referral):         Pt diagnosed with BV ~ 8 months ago, and then gradually started to notice symptoms (which have progressively worsened). Urinary: No complaints at this time. History of bladder suspension, which resolved the symptoms she was having in 1994. Bowel: Daily, with occasional pushing or straining. Sexual: 10/10 pain reported with attempts at full penile penetration. Described as \"something clamping shut\", \"hitting a wall\" and tearing or ripping. Marinoff 2 at this time. Pelvic Organ Prolapse/Pelvic Pain: Denies. Past Medical History/Comorbidities:   Ms. Dev Hui  has a past medical history of Asthma; Cervical spondylosis with radiculopathy (2/21/2017); Chronic pain; GERD (gastroesophageal reflux disease); Hyperlipidemia; Hypertension; Hypothyroid; Lupus; Rheumatoid arthritis ([facility] [address] Rosacea; and Vitamin D deficiency.   Ms. Dev Hui  has a past surgical history that includes hx appendectomy; hx bladder suspension; hx endoscopy (2012); hx cervical fusion (2017); hx colonoscopy (2012); hx salpingo-oophorectomy (Bilateral, 1996); hx knee arthroscopy (Right); hx orthopaedic (Left); hx other surgical; and hx total abdominal hysterectomy (1994). Social History/Living Environment:    Lives with spouse and 2 dogs. Prior Level of Function/Work/Activity:  Retired; Functionally I at this time. Current Medications:       Current Outpatient Prescriptions:     estradiol (ESTRACE) 0.01 % (0.1 mg/gram) vaginal cream, Insert 0.5 g into vagina daily. , Disp: 42.5 g, Rfl: 3    ciprofloxacin HCl (CIPRO) 500 mg tablet, Take 1 Tab by mouth two (2) times a day., Disp: 14 Tab, Rfl: 0    estradiol (ESTRACE) 1 mg tablet, Take 1 Tab by mouth daily. , Disp: 90 Tab, Rfl: 3    clobetasol (TEMOVATE) 0.05 % ointment, Apply  to affected area two (2) times a day., Disp: 15 g, Rfl: 0    ADVAIR DISKUS 500-50 mcg/dose diskus inhaler, USE 1 INHALATION TWICE A DAY, Disp: 180 Each, Rfl: 3    triamterene-hydroCHLOROthiazide (DYAZIDE) 37.5-25 mg per capsule, Take 1 Cap by mouth daily. , Disp: 90 Cap, Rfl: 3    levothyroxine (SYNTHROID) 112 mcg tablet, Take 1 Tab by mouth Daily (before breakfast). , Disp: 90 Tab, Rfl: 3    simvastatin (ZOCOR) 20 mg tablet, Take 1 Tab by mouth nightly. (Patient taking differently: Take 20 mg by mouth every morning.), Disp: 90 Tab, Rfl: 3    MONTELUKAST SODIUM (SINGULAIR PO), Take 20 mg by mouth every morning., Disp: , Rfl:     albuterol (VENTOLIN HFA) 90 mcg/actuation inhaler, Take 1 Puff by inhalation every four (4) hours as needed. Indications: bring on the dos, Disp: , Rfl:     RITUXIMAB (RITUXAN IV), by IntraVENous route. Indications: twice yearly, Disp: , Rfl:     conjugated estrogens (PREMARIN) 0.625 mg/gram vaginal cream, Insert 0.5 g into vagina daily as needed. , Disp: , Rfl:     ketoconazole (NIZORAL) 2 % shampoo, Apply  to affected area as needed. , Disp: , Rfl:    Date Last Reviewed:  3/8/2018   Number of Personal Factors/Comorbidities that affect the Plan of Care: 0: LOW COMPLEXITY   EXAMINATION:   Palpation:          Via vaginal canal: L superficial transverse perineal tender to palpation (TTP) with tearing. R pubococcygeus, B iliococcygeus and B obturator internus (OI) TTP with wincing and reproduction of              dyspareunia. Adductors: moderate connective tissue (CT) restrictions with pain and wincing proximally. Strength:  Via vaginal canal        P: Power, E: Endurance, R: Repetitions, QF: Quick Flicks, TrA: Transverse Abdominus, DB: Diaphragmatic Breathing  P 2/5, though  Unable to fully assess due to overactivity. Moderate delay in relaxation   E NT due to active pain in PFM   R NT due to active pain in PFM   QF NT due to active pain in PFM   TrA Unable to coordinate; bulging noted   DB Normal abdominal expansion, but no PFM descent        Body Structures Involved:  1. Muscles Body Functions Affected:  1. Neuromusculoskeletal  2. Movement Related Activities and Participation Affected:  1. Self Care  2. Interpersonal Interactions and Relationships   Number of elements (examined above) that affect the Plan of Care: 3: MODERATE COMPLEXITY   CLINICAL PRESENTATION:   Presentation: Stable and uncomplicated: LOW COMPLEXITY   CLINICAL DECISION MAKING:   Outcome Measure: Tool Used: Pain Disability Index  Score:  Initial: 2/23/2018 (10/70- but 10/10 for sexual activity) Most Recent: TBD   Interpretation of Score: The rating scale measures the degree to which aspects of your life are disrupted by chronic pain. For each of the 7 categories, patient circles the level of disability they experience 0 to 10. 0 signifies no disability at all, 10 signifies that these activities are totally disrupted by pain. ?   Self Care:     - CURRENT STATUS: CJ - 20%-39% impaired, limited or restricted    - GOAL STATUS: CH - 0% impaired, limited or restricted    - D/C STATUS:  ---------------To be determined---------------    Medical Necessity:   · Patient is expected to demonstrate progress in range of motion, coordination and functional technique to eliminate pain during intercourse and return to painfree sexual activity. Reason for Services/Other Comments:  · Patient continues to require skilled intervention due to above mentioned deficits. Use of outcome tool(s) and clinical judgement create a POC that gives a: Clear prediction of patient's progress: LOW COMPLEXITY            TREATMENT:   (In addition to Assessment/Re-Assessment sessions the following treatments were rendered)    Pre-treatment Symptoms/Complaints:  Had intercourse and used the lubricant sample and it was \"good\". Was a little sore afterwards. Pain: Initial:   Pain Intensity 1: 0/10 Post Session:  0/10     THERAPEUTIC EXERCISE: (15 minutes):  Exercises per grid below to improve mobility, strength and coordination. Required moderate verbal and tactile cues to promote N PFM function. Progressed resistance, range and complexity of movement as indicated. Date:  3/8/2018     Activity/Exercise Parameters   Pelvic Floor Drops 5 minutes   Diaphragmatic Breathing 5 minutes   Adductor Stretch    Sexual Positioning 2 minutes   Cat/Camel/Lateral Tilts 2 minutes   HEP Drops, Adductor Stretch, Cat/Camel/Lateral Tilts 2-3 x daily; 1 minute     MANUAL THERAPY: (40 minutes): Soft tissue mobilization was utilized and necessary because of the patient's painful spasm and restricted motion of soft tissue. (Used abbreviations: MET - muscle energy technique; SCS- Strain counter strain; CTM- Connective tissue mobilizations; CR- Contract/relax; SP- Sustained pressure, TrP- Trigger point release, IASTM- Instrument assisted soft tissue mobilizations, TDN- Trigger point dry needling)  - CTM  And IASTM to adductors and ischiorectal fossa  - 1 finger MT to superficial and deep PFM (to include SCS, CR, and SP)  - Medium, Large and XL vaginismus dilator      Treatment/Session Assessment:    · Response to Treatment:  Improvements in tolerance for internal interventions, with most discomfort at perineal body and STP B with 1 finger stretch.  Med and large dilators were painfree with minimal resistance and XL was slightly tight/stretching with mild resistance. Some vaginal soreness noted after therapy. Progressing well at this time. · Compliance with Program/Exercises: Compliant with HEP. Achieved STG 1 at this time. · Recommendations/Intent for next treatment session: \"Next visit will focus on internal, external (hamstrings), XL/XXL dilator training\".     Total Treatment Duration: 55 minutes, 5 minutes roxann/doff clothing  PT Patient Time In/Time Out  Time In: 1005  Time Out: [facility],

## 2018-03-13 ENCOUNTER — HOSPITAL ENCOUNTER (OUTPATIENT)
Dept: PHYSICAL THERAPY | Age: 68
Discharge: HOME | End: 2018-03-13
Attending: OBSTETRICS & GYNECOLOGY
Payer: MEDICARE

## 2018-03-13 PROCEDURE — 97110 THERAPEUTIC EXERCISES: CPT

## 2018-03-13 PROCEDURE — 97140 MANUAL THERAPY 1/> REGIONS: CPT

## 2018-03-13 NOTE — PROGRESS NOTES
Garett Aponte  : 1950  [place] / Product Type: Medicare /  [address]  at [address] [place] [address]  [phone]   [phone]          OUTPATIENT PHYSICAL THERAPY:Daily Note 3/13/2018    ICD-10: Treatment Diagnosis: spasm of muscle (M62.838); lack of coordination (R27.8); dyspareunia (N94.1)  Precautions/Allergies:   Augmentin [amoxicillin-pot clavulanate]; Clindamycin; Linezolid; Nitrofurantoin macrocrystalline; and Vioxx [rofecoxib]   Fall Risk Score: 0 (? 5 = High Risk)  MD Orders: Evaluate and treat MEDICAL/REFERRING DIAGNOSIS:  Other specified dyspareunia [N94.19]   DATE OF ONSET: ~ 8 months ago  REFERRING PHYSICIAN: Beatriz Taylor MD  RETURN PHYSICIAN APPOINTMENT: TBD     INITIAL ASSESSMENT:  Ms. Kate Mejia presents with moderate pelvic floor muscle (PFM) over activity and lack of coordination, resulting in pain during intercourse. She also demonstrates weakness throughout her core musculature and pelvic girdle, as well as tightness in her proximal lower extremities. This is contributing to her difficulty with pain free penile penetration. I believe she will benefit from skilled PT, with an emphasis on manual therapy and muscle retraining, strength, and coordination to improve her ability to perform sexual activity without pain or difficulty. She is pleasant, motivated and eager to return to her prior level of function (PLOF). PROBLEM LIST (Impacting functional limitations):  1. Decreased Strength  2. Increased Pain  3. Decreased Flexibility/Joint Mobility  4. Decreased [place] with Home Exercise Program INTERVENTIONS PLANNED:  1. Electrical Stimulation  2. Heat  3. Home Exercise Program (HEP)  4. Manual Therapy  5. Therapeutic Exercise/Strengthening   TREATMENT PLAN:  Effective Dates: 3/13/2018 TO 2018.   Frequency/Duration: 1 time a week for 8 weeks    GOALS: (Goals have been discussed and agreed upon with patient.)    Short-Term Functional Goals: Time Frame: 4 weeks  Pt will demonstrate I with basic PFM HEP to improve awareness, coordination, and timing of PFM. (MET 3/8/2018)    Discharge Goals: Time Frame: 8 weeks  1. Pt with demonstrate normal voluntary relaxation of the pelvic floor muscle group to improve pelvic floor ROM and tolerate pain free penile penetration. 2. Pt will tolerate pain free 1 finger examination of PFM to transition to pain free intercourse in various positions. 3. Pt will report 0/10 pain with intercourse to return to her PLOF. Rehabilitation Potential For Stated Goals: Good                The information in this section was collected on 3/13/2018   (except where otherwise noted). HISTORY:   History of Present Injury/Illness (Reason for Referral):         Pt diagnosed with BV ~ 8 months ago, and then gradually started to notice symptoms (which have progressively worsened). Urinary: No complaints at this time. History of bladder suspension, which resolved the symptoms she was having in 1994. Bowel: Daily, with occasional pushing or straining. Sexual: 10/10 pain reported with attempts at full penile penetration. Described as \"something clamping shut\", \"hitting a wall\" and tearing or ripping. Marinoff 2 at this time. Pelvic Organ Prolapse/Pelvic Pain: Denies. Past Medical History/Comorbidities:   Ms. Ted Billingsley  has a past medical history of Asthma; Cervical spondylosis with radiculopathy (2/21/2017); Chronic pain; GERD (gastroesophageal reflux disease); Hyperlipidemia; Hypertension; Hypothyroid; Lupus; Rheumatoid arthritis ([facility] [address] Rosacea; and Vitamin D deficiency.   Ms. Ted Billingsley  has a past surgical history that includes hx appendectomy; hx bladder suspension; hx endoscopy (2012); hx cervical fusion (2017); hx colonoscopy (2012); hx salpingo-oophorectomy (Bilateral, 1996); hx knee arthroscopy (Right); hx orthopaedic (Left); hx other surgical; and hx total abdominal hysterectomy (1994). Social History/Living Environment:    Lives with spouse and 2 dogs. Prior Level of Function/Work/Activity:  Retired; Functionally I at this time. Current Medications:       Current Outpatient Prescriptions:     estradiol (ESTRACE) 0.01 % (0.1 mg/gram) vaginal cream, Insert 0.5 g into vagina daily. , Disp: 42.5 g, Rfl: 3    ciprofloxacin HCl (CIPRO) 500 mg tablet, Take 1 Tab by mouth two (2) times a day., Disp: 14 Tab, Rfl: 0    estradiol (ESTRACE) 1 mg tablet, Take 1 Tab by mouth daily. , Disp: 90 Tab, Rfl: 3    clobetasol (TEMOVATE) 0.05 % ointment, Apply  to affected area two (2) times a day., Disp: 15 g, Rfl: 0    ADVAIR DISKUS 500-50 mcg/dose diskus inhaler, USE 1 INHALATION TWICE A DAY, Disp: 180 Each, Rfl: 3    triamterene-hydroCHLOROthiazide (DYAZIDE) 37.5-25 mg per capsule, Take 1 Cap by mouth daily. , Disp: 90 Cap, Rfl: 3    levothyroxine (SYNTHROID) 112 mcg tablet, Take 1 Tab by mouth Daily (before breakfast). , Disp: 90 Tab, Rfl: 3    simvastatin (ZOCOR) 20 mg tablet, Take 1 Tab by mouth nightly. (Patient taking differently: Take 20 mg by mouth every morning.), Disp: 90 Tab, Rfl: 3    MONTELUKAST SODIUM (SINGULAIR PO), Take 20 mg by mouth every morning., Disp: , Rfl:     albuterol (VENTOLIN HFA) 90 mcg/actuation inhaler, Take 1 Puff by inhalation every four (4) hours as needed. Indications: bring on the dos, Disp: , Rfl:     RITUXIMAB (RITUXAN IV), by IntraVENous route. Indications: twice yearly, Disp: , Rfl:     conjugated estrogens (PREMARIN) 0.625 mg/gram vaginal cream, Insert 0.5 g into vagina daily as needed. , Disp: , Rfl:     ketoconazole (NIZORAL) 2 % shampoo, Apply  to affected area as needed. , Disp: , Rfl:    Date Last Reviewed:  3/13/2018   Number of Personal Factors/Comorbidities that affect the Plan of Care: 0: LOW COMPLEXITY   EXAMINATION:   Palpation:          Via vaginal canal: L superficial transverse perineal tender to palpation (TTP) with tearing. R pubococcygeus, B iliococcygeus and B obturator internus (OI) TTP with wincing and reproduction of              dyspareunia. Adductors: moderate connective tissue (CT) restrictions with pain and wincing proximally. Strength:  Via vaginal canal        P: Power, E: Endurance, R: Repetitions, QF: Quick Flicks, TrA: Transverse Abdominus, DB: Diaphragmatic Breathing  P 2/5, though  Unable to fully assess due to overactivity. Moderate delay in relaxation   E NT due to active pain in PFM   R NT due to active pain in PFM   QF NT due to active pain in PFM   TrA Unable to coordinate; bulging noted   DB Normal abdominal expansion, but no PFM descent        Body Structures Involved:  1. Muscles Body Functions Affected:  1. Neuromusculoskeletal  2. Movement Related Activities and Participation Affected:  1. Self Care  2. Interpersonal Interactions and Relationships   Number of elements (examined above) that affect the Plan of Care: 3: MODERATE COMPLEXITY   CLINICAL PRESENTATION:   Presentation: Stable and uncomplicated: LOW COMPLEXITY   CLINICAL DECISION MAKING:   Outcome Measure: Tool Used: Pain Disability Index  Score:  Initial: 2/23/2018 (10/70- but 10/10 for sexual activity) Most Recent: TBD   Interpretation of Score: The rating scale measures the degree to which aspects of your life are disrupted by chronic pain. For each of the 7 categories, patient circles the level of disability they experience 0 to 10. 0 signifies no disability at all, 10 signifies that these activities are totally disrupted by pain. ?   Self Care:     - CURRENT STATUS: CJ - 20%-39% impaired, limited or restricted    - GOAL STATUS:  - 0% impaired, limited or restricted    - D/C STATUS:  ---------------To be determined---------------    Medical Necessity:   · Patient is expected to demonstrate progress in range of motion, coordination and functional technique to eliminate pain during intercourse and return to painfree sexual activity. Reason for Services/Other Comments:  · Patient continues to require skilled intervention due to above mentioned deficits. Use of outcome tool(s) and clinical judgement create a POC that gives a: Clear prediction of patient's progress: LOW COMPLEXITY            TREATMENT:   (In addition to Assessment/Re-Assessment sessions the following treatments were rendered)    Pre-treatment Symptoms/Complaints:  Had intercourse last night and feels like \"the wall was back\". More uncomfortable than last time. 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     Pain: Initial:   Pain Intensity 1: 0/10 Post Session:  0/10     THERAPEUTIC EXERCISE: (10 minutes):  Exercises per grid below to improve mobility, strength and coordination. Required moderate verbal and tactile cues to promote N PFM function. Progressed resistance, range and complexity of movement as indicated. Date:  3/13/2018     Activity/Exercise Parameters   Pelvic Floor Drops 3 minutes   Diaphragmatic Breathing    Adductor Stretch    Home Dilator Use 5 minutes   Cat/Camel/Lateral Tilts 2 minutes   HEP Discussed during MT; Drops, Adductor Stretch, Cat/Camel/Lateral Tilts 2-3 x daily     MANUAL THERAPY: (30 minutes): Soft tissue mobilization was utilized and necessary because of the patient's painful spasm and restricted motion of soft tissue.   (Used abbreviations: MET - muscle energy technique; SCS- Strain counter strain; CTM- Connective tissue mobilizations; CR- Contract/relax; SP- Sustained pressure, TrP- Trigger point release, IASTM- Instrument assisted soft tissue mobilizations, TDN- Trigger point dry needling)  - CTM  And IASTM to hamstrings (in prone)  - 1 finger MT to superficial and deep PFM (to include SCS, CR, and SP)  - XL vaginismus dilator      Treatment/Session Assessment:    · Response to Treatment:  Emphasized posterior chain externally this date, as well as continued internal. Midline, deep PFM continue to be most provacative. Provided with medium soul source dilator and instructions to begin use at home 1-2 x weekly (prior to intercourse or separate). Tolerated XL dilator following MT without pain (mostly tight and stretching). · Compliance with Program/Exercises: Compliant with HEP. · Recommendations/Intent for next treatment session: \"Next visit will focus on internal, external (adductors, suprapubic), XL/XXL dilator training\".     Total Treatment Duration: 40 minutes, 5 minutes roxann/doff clothing  PT Patient Time In/Time Out  Time In: 1015  Time Out: [address] [facility], PT

## 2018-03-20 ENCOUNTER — HOSPITAL ENCOUNTER (OUTPATIENT)
Dept: PHYSICAL THERAPY | Age: 68
Discharge: HOME | End: 2018-03-20
Attending: OBSTETRICS & GYNECOLOGY
Payer: MEDICARE

## 2018-03-20 PROCEDURE — 97140 MANUAL THERAPY 1/> REGIONS: CPT

## 2018-03-20 PROCEDURE — 97110 THERAPEUTIC EXERCISES: CPT

## 2018-03-20 NOTE — PROGRESS NOTES
Nandini Goncalves  : 1950  [place] / Product Type: Medicare /  [address]  at [facility] [address]  [phone]   [phone]          OUTPATIENT PHYSICAL THERAPY:Daily Note 3/20/2018    ICD-10: Treatment Diagnosis: spasm of muscle (M62.838); lack of coordination (R27.8); dyspareunia (N94.1)  Precautions/Allergies:   Augmentin [amoxicillin-pot clavulanate]; Clindamycin; Linezolid; Nitrofurantoin macrocrystalline; and Vioxx [rofecoxib]   Fall Risk Score: 0 (? 5 = High Risk)  MD Orders: Evaluate and treat MEDICAL/REFERRING DIAGNOSIS:  Other specified dyspareunia [N94.19]   DATE OF ONSET: ~ 8 months ago  REFERRING PHYSICIAN: Lizzy Polo MD  RETURN PHYSICIAN APPOINTMENT: TBD     INITIAL ASSESSMENT:  Ms. Estella Monae presents with moderate pelvic floor muscle (PFM) over activity and lack of coordination, resulting in pain during intercourse. She also demonstrates weakness throughout her core musculature and pelvic girdle, as well as tightness in her proximal lower extremities. This is contributing to her difficulty with pain free penile penetration. I believe she will benefit from skilled PT, with an emphasis on manual therapy and muscle retraining, strength, and coordination to improve her ability to perform sexual activity without pain or difficulty. She is pleasant, motivated and eager to return to her prior level of function (PLOF). PROBLEM LIST (Impacting functional limitations):  1. Decreased Strength  2. Increased Pain  3. Decreased Flexibility/Joint Mobility  4. Decreased [place] with Home Exercise Program INTERVENTIONS PLANNED:  1. Electrical Stimulation  2. Heat  3. Home Exercise Program (HEP)  4. Manual Therapy  5. Therapeutic Exercise/Strengthening   TREATMENT PLAN:  Effective Dates: 3/20/2018 TO 2018.   Frequency/Duration: 1 time a week for 8 weeks    GOALS: (Goals have been discussed and agreed upon with patient.)    Short-Term Functional Goals: Time Frame: 4 weeks  Pt will demonstrate I with basic PFM HEP to improve awareness, coordination, and timing of PFM. (MET 3/8/2018)    Discharge Goals: Time Frame: 8 weeks  1. Pt with demonstrate normal voluntary relaxation of the pelvic floor muscle group to improve pelvic floor ROM and tolerate pain free penile penetration. 2. Pt will tolerate pain free 1 finger examination of PFM to transition to pain free intercourse in various positions. 3. Pt will report 0/10 pain with intercourse to return to her PLOF. Rehabilitation Potential For Stated Goals: Good                The information in this section was collected on 3/20/2018   (except where otherwise noted). HISTORY:   History of Present Injury/Illness (Reason for Referral):         Pt diagnosed with BV ~ 8 months ago, and then gradually started to notice symptoms (which have progressively worsened). Urinary: No complaints at this time. History of bladder suspension, which resolved the symptoms she was having in 1994. Bowel: Daily, with occasional pushing or straining. Sexual: 10/10 pain reported with attempts at full penile penetration. Described as \"something clamping shut\", \"hitting a wall\" and tearing or ripping. Marinoff 2 at this time. Pelvic Organ Prolapse/Pelvic Pain: Denies. Past Medical History/Comorbidities:   Ms. Moy Pereira  has a past medical history of Asthma; Cervical spondylosis with radiculopathy (2/21/2017); Chronic pain; GERD (gastroesophageal reflux disease); Hyperlipidemia; Hypertension; Hypothyroid; Lupus; Rheumatoid arthritis ([facility] [address] Rosacea; and Vitamin D deficiency.   Ms. Moy Pereira  has a past surgical history that includes hx appendectomy; hx bladder suspension; hx endoscopy (2012); hx cervical fusion (2017); hx colonoscopy (2012); hx salpingo-oophorectomy (Bilateral, 1996); hx knee arthroscopy (Right); hx orthopaedic (Left); hx other surgical; and hx total abdominal hysterectomy (1994). Social History/Living Environment:    Lives with spouse and 2 dogs. Prior Level of Function/Work/Activity:  Retired; Functionally I at this time. Current Medications:       Current Outpatient Prescriptions:     estradiol (ESTRACE) 0.01 % (0.1 mg/gram) vaginal cream, Insert 0.5 g into vagina daily. , Disp: 42.5 g, Rfl: 3    ciprofloxacin HCl (CIPRO) 500 mg tablet, Take 1 Tab by mouth two (2) times a day., Disp: 14 Tab, Rfl: 0    estradiol (ESTRACE) 1 mg tablet, Take 1 Tab by mouth daily. , Disp: 90 Tab, Rfl: 3    clobetasol (TEMOVATE) 0.05 % ointment, Apply  to affected area two (2) times a day., Disp: 15 g, Rfl: 0    ADVAIR DISKUS 500-50 mcg/dose diskus inhaler, USE 1 INHALATION TWICE A DAY, Disp: 180 Each, Rfl: 3    triamterene-hydroCHLOROthiazide (DYAZIDE) 37.5-25 mg per capsule, Take 1 Cap by mouth daily. , Disp: 90 Cap, Rfl: 3    levothyroxine (SYNTHROID) 112 mcg tablet, Take 1 Tab by mouth Daily (before breakfast). , Disp: 90 Tab, Rfl: 3    simvastatin (ZOCOR) 20 mg tablet, Take 1 Tab by mouth nightly. (Patient taking differently: Take 20 mg by mouth every morning.), Disp: 90 Tab, Rfl: 3    MONTELUKAST SODIUM (SINGULAIR PO), Take 20 mg by mouth every morning., Disp: , Rfl:     albuterol (VENTOLIN HFA) 90 mcg/actuation inhaler, Take 1 Puff by inhalation every four (4) hours as needed. Indications: bring on the dos, Disp: , Rfl:     RITUXIMAB (RITUXAN IV), by IntraVENous route. Indications: twice yearly, Disp: , Rfl:     conjugated estrogens (PREMARIN) 0.625 mg/gram vaginal cream, Insert 0.5 g into vagina daily as needed. , Disp: , Rfl:     ketoconazole (NIZORAL) 2 % shampoo, Apply  to affected area as needed. , Disp: , Rfl:    Date Last Reviewed:  3/20/2018   Number of Personal Factors/Comorbidities that affect the Plan of Care: 0: LOW COMPLEXITY   EXAMINATION:   Palpation:          Via vaginal canal: L superficial transverse perineal tender to palpation (TTP) with tearing. R pubococcygeus, B iliococcygeus and B obturator internus (OI) TTP with wincing and reproduction of              dyspareunia. Adductors: moderate connective tissue (CT) restrictions with pain and wincing proximally. Strength:  Via vaginal canal        P: Power, E: Endurance, R: Repetitions, QF: Quick Flicks, TrA: Transverse Abdominus, DB: Diaphragmatic Breathing  P 2/5, though  Unable to fully assess due to overactivity. Moderate delay in relaxation   E NT due to active pain in PFM   R NT due to active pain in PFM   QF NT due to active pain in PFM   TrA Unable to coordinate; bulging noted   DB Normal abdominal expansion, but no PFM descent        Body Structures Involved:  1. Muscles Body Functions Affected:  1. Neuromusculoskeletal  2. Movement Related Activities and Participation Affected:  1. Self Care  2. Interpersonal Interactions and Relationships   Number of elements (examined above) that affect the Plan of Care: 3: MODERATE COMPLEXITY   CLINICAL PRESENTATION:   Presentation: Stable and uncomplicated: LOW COMPLEXITY   CLINICAL DECISION MAKING:   Outcome Measure: Tool Used: Pain Disability Index  Score:  Initial: 2/23/2018 (10/70- but 10/10 for sexual activity) Most Recent: TBD   Interpretation of Score: The rating scale measures the degree to which aspects of your life are disrupted by chronic pain. For each of the 7 categories, patient circles the level of disability they experience 0 to 10. 0 signifies no disability at all, 10 signifies that these activities are totally disrupted by pain. ?   Self Care:     - CURRENT STATUS: CJ - 20%-39% impaired, limited or restricted    - GOAL STATUS:  - 0% impaired, limited or restricted    - D/C STATUS:  ---------------To be determined---------------    Medical Necessity:   · Patient is expected to demonstrate progress in range of motion, coordination and functional technique to eliminate pain during intercourse and return to painfree sexual activity. Reason for Services/Other Comments:  · Patient continues to require skilled intervention due to above mentioned deficits. Use of outcome tool(s) and clinical judgement create a POC that gives a: Clear prediction of patient's progress: LOW COMPLEXITY            TREATMENT:   (In addition to Assessment/Re-Assessment sessions the following treatments were rendered)    Pre-treatment Symptoms/Complaints:  Had sex on Saturday and he hit the wall again. Same severity of pain. Saw GYN and was instructed to put corticosteroid on topically daily for a few weeks to improve tearing at vestibule. Pain: Initial:   Pain Intensity 1: 0/10 Post Session:  0/10     THERAPEUTIC EXERCISE: (10 minutes):  Exercises per grid below to improve mobility, strength and coordination. Required moderate verbal and tactile cues to promote N PFM function. Progressed resistance, range and complexity of movement as indicated. Date:  3/20/2018     Activity/Exercise Parameters   Pelvic Floor Drops 5 minutes   Diaphragmatic Breathing 5 minutes   Adductor Stretch    Home Dilator Use    Cat/Camel/Lateral Tilts    HEP Discussed during MT; Drops, Adductor Stretch, Cat/Camel/Lateral Tilts 2-3 x daily     MANUAL THERAPY: (45 minutes): Soft tissue mobilization was utilized and necessary because of the patient's painful spasm and restricted motion of soft tissue.   (Used abbreviations: MET - muscle energy technique; SCS- Strain counter strain; CTM- Connective tissue mobilizations; CR- Contract/relax; SP- Sustained pressure, TrP- Trigger point release, IASTM- Instrument assisted soft tissue mobilizations, TDN- Trigger point dry needling)  - CTM  And IASTM to adductors  - CTM to ischiorectal fossa  - 1 finger MT to superficial and deep PFM (to include SCS, CR, and SP); L and R side  - XL, XXL vaginismus dilator      Treatment/Session Assessment:    · Response to Treatment:  2/10 pain reported at midline, 2nd and 3rd layer, with 1 finger palpation, but then XL dilator inserted without pain. Progressed to XXL with < 2/10 discomfort and able to move slightly within the vaginal vault. No pain following PT. Instructed to attempt dilator at home, but  is larger than the largest dilator, so may not provide a large stretch prior to intercourse. · Compliance with Program/Exercises: Compliant with HEP. · Recommendations/Intent for next treatment session: \"Next visit will focus on internal, external (adductors, suprapubic), XL/XXL dilator training\".     Total Treatment Duration: 55 minutes, 5 minutes roxann/doff clothing  PT Patient Time In/Time Out  Time In: 1225  Time Out: [address], PT

## 2018-03-22 ENCOUNTER — APPOINTMENT (OUTPATIENT)
Dept: PHYSICAL THERAPY | Age: 68
End: 2018-03-22
Attending: OBSTETRICS & GYNECOLOGY
Payer: MEDICARE

## 2018-03-27 ENCOUNTER — HOSPITAL ENCOUNTER (OUTPATIENT)
Dept: PHYSICAL THERAPY | Age: 68
Discharge: HOME | End: 2018-03-27
Attending: OBSTETRICS & GYNECOLOGY
Payer: MEDICARE

## 2018-03-27 PROCEDURE — 97140 MANUAL THERAPY 1/> REGIONS: CPT

## 2018-03-27 PROCEDURE — 97110 THERAPEUTIC EXERCISES: CPT

## 2018-03-27 NOTE — PROGRESS NOTES
John Blackman  : 1950  [place] / Product Type: Medicare /  [address] [place]  at [facility]  [phone]   [phone]          OUTPATIENT PHYSICAL THERAPY:Daily Note 3/27/2018    ICD-10: Treatment Diagnosis: spasm of muscle (M62.838); lack of coordination (R27.8); dyspareunia (N94.1)  Precautions/Allergies:   Augmentin [amoxicillin-pot clavulanate]; Clindamycin; Linezolid; Nitrofurantoin macrocrystalline; and Vioxx [rofecoxib]   Fall Risk Score: 0 (? 5 = High Risk)  MD Orders: Evaluate and treat MEDICAL/REFERRING DIAGNOSIS:  Other specified dyspareunia [N94.19]   DATE OF ONSET: ~ 8 months ago  REFERRING PHYSICIAN: Daria Crocker MD  RETURN PHYSICIAN APPOINTMENT: TBD     INITIAL ASSESSMENT:  Ms. Naseem Forbes presents with moderate pelvic floor muscle (PFM) over activity and lack of coordination, resulting in pain during intercourse. She also demonstrates weakness throughout her core musculature and pelvic girdle, as well as tightness in her proximal lower extremities. This is contributing to her difficulty with pain free penile penetration. I believe she will benefit from skilled PT, with an emphasis on manual therapy and muscle retraining, strength, and coordination to improve her ability to perform sexual activity without pain or difficulty. She is pleasant, motivated and eager to return to her prior level of function (PLOF). PROBLEM LIST (Impacting functional limitations):  1. Decreased Strength  2. Increased Pain  3. Decreased Flexibility/Joint Mobility  4. Decreased [place] with Home Exercise Program INTERVENTIONS PLANNED:  1. Electrical Stimulation  2. Heat  3. Home Exercise Program (HEP)  4. Manual Therapy  5. Therapeutic Exercise/Strengthening   TREATMENT PLAN:  Effective Dates: 3/27/2018 TO 2018.   Frequency/Duration: 1 time a week for 8 weeks    GOALS: (Goals have been discussed and agreed upon with patient.)    Short-Term Functional Goals: Time Frame: 4 weeks  Pt will demonstrate I with basic PFM HEP to improve awareness, coordination, and timing of PFM. (MET 3/8/2018)    Discharge Goals: Time Frame: 8 weeks  1. Pt with demonstrate normal voluntary relaxation of the pelvic floor muscle group to improve pelvic floor ROM and tolerate pain free penile penetration. 2. Pt will tolerate pain free 1 finger examination of PFM to transition to pain free intercourse in various positions. 3. Pt will report 0/10 pain with intercourse to return to her PLOF. Rehabilitation Potential For Stated Goals: Good                The information in this section was collected on 3/27/2018   (except where otherwise noted). HISTORY:   History of Present Injury/Illness (Reason for Referral):         Pt diagnosed with BV ~ 8 months ago, and then gradually started to notice symptoms (which have progressively worsened). Urinary: No complaints at this time. History of bladder suspension, which resolved the symptoms she was having in 1994. Bowel: Daily, with occasional pushing or straining. Sexual: 10/10 pain reported with attempts at full penile penetration. Described as \"something clamping shut\", \"hitting a wall\" and tearing or ripping. Marinoff 2 at this time. Pelvic Organ Prolapse/Pelvic Pain: Denies. Past Medical History/Comorbidities:   Ms. Sonu Coyle  has a past medical history of Asthma; Cervical spondylosis with radiculopathy (2/21/2017); Chronic pain; GERD (gastroesophageal reflux disease); Hyperlipidemia; Hypertension; Hypothyroid; Lupus; Rheumatoid arthritis ([facility] [address] Rosacea; and Vitamin D deficiency.   Ms. Sonu Coyle  has a past surgical history that includes hx appendectomy; hx bladder suspension; hx endoscopy (2012); hx cervical fusion (2017); hx colonoscopy (2012); hx salpingo-oophorectomy (Bilateral, 1996); hx knee arthroscopy (Right); hx orthopaedic (Left); hx other surgical; and hx total abdominal hysterectomy (1994). Social History/Living Environment:    Lives with spouse and 2 dogs. Prior Level of Function/Work/Activity:  Retired; Functionally I at this time. Current Medications:       Current Outpatient Prescriptions:     estradiol (ESTRACE) 0.01 % (0.1 mg/gram) vaginal cream, Insert 0.5 g into vagina daily. , Disp: 42.5 g, Rfl: 3    ciprofloxacin HCl (CIPRO) 500 mg tablet, Take 1 Tab by mouth two (2) times a day., Disp: 14 Tab, Rfl: 0    estradiol (ESTRACE) 1 mg tablet, Take 1 Tab by mouth daily. , Disp: 90 Tab, Rfl: 3    clobetasol (TEMOVATE) 0.05 % ointment, Apply  to affected area two (2) times a day., Disp: 15 g, Rfl: 0    ADVAIR DISKUS 500-50 mcg/dose diskus inhaler, USE 1 INHALATION TWICE A DAY, Disp: 180 Each, Rfl: 3    triamterene-hydroCHLOROthiazide (DYAZIDE) 37.5-25 mg per capsule, Take 1 Cap by mouth daily. , Disp: 90 Cap, Rfl: 3    levothyroxine (SYNTHROID) 112 mcg tablet, Take 1 Tab by mouth Daily (before breakfast). , Disp: 90 Tab, Rfl: 3    simvastatin (ZOCOR) 20 mg tablet, Take 1 Tab by mouth nightly. (Patient taking differently: Take 20 mg by mouth every morning.), Disp: 90 Tab, Rfl: 3    MONTELUKAST SODIUM (SINGULAIR PO), Take 20 mg by mouth every morning., Disp: , Rfl:     albuterol (VENTOLIN HFA) 90 mcg/actuation inhaler, Take 1 Puff by inhalation every four (4) hours as needed. Indications: bring on the dos, Disp: , Rfl:     RITUXIMAB (RITUXAN IV), by IntraVENous route. Indications: twice yearly, Disp: , Rfl:     conjugated estrogens (PREMARIN) 0.625 mg/gram vaginal cream, Insert 0.5 g into vagina daily as needed. , Disp: , Rfl:     ketoconazole (NIZORAL) 2 % shampoo, Apply  to affected area as needed. , Disp: , Rfl:    Date Last Reviewed:  3/27/2018   Number of Personal Factors/Comorbidities that affect the Plan of Care: 0: LOW COMPLEXITY   EXAMINATION:   Palpation:          Via vaginal canal: L superficial transverse perineal tender to palpation (TTP) with tearing. R pubococcygeus, B iliococcygeus and B obturator internus (OI) TTP with wincing and reproduction of              dyspareunia. Adductors: moderate connective tissue (CT) restrictions with pain and wincing proximally. Strength:  Via vaginal canal        P: Power, E: Endurance, R: Repetitions, QF: Quick Flicks, TrA: Transverse Abdominus, DB: Diaphragmatic Breathing  P 2/5, though  Unable to fully assess due to overactivity. Moderate delay in relaxation   E NT due to active pain in PFM   R NT due to active pain in PFM   QF NT due to active pain in PFM   TrA Unable to coordinate; bulging noted   DB Normal abdominal expansion, but no PFM descent        Body Structures Involved:  1. Muscles Body Functions Affected:  1. Neuromusculoskeletal  2. Movement Related Activities and Participation Affected:  1. Self Care  2. Interpersonal Interactions and Relationships   Number of elements (examined above) that affect the Plan of Care: 3: MODERATE COMPLEXITY   CLINICAL PRESENTATION:   Presentation: Stable and uncomplicated: LOW COMPLEXITY   CLINICAL DECISION MAKING:   Outcome Measure: Tool Used: Pain Disability Index  Score:  Initial: 2/23/2018 (10/70- but 10/10 for sexual activity) Most Recent: TBD   Interpretation of Score: The rating scale measures the degree to which aspects of your life are disrupted by chronic pain. For each of the 7 categories, patient circles the level of disability they experience 0 to 10. 0 signifies no disability at all, 10 signifies that these activities are totally disrupted by pain. ?   Self Care:     - CURRENT STATUS: CJ - 20%-39% impaired, limited or restricted    - GOAL STATUS:  - 0% impaired, limited or restricted    - D/C STATUS:  ---------------To be determined---------------    Medical Necessity:   · Patient is expected to demonstrate progress in range of motion, coordination and functional technique to eliminate pain during intercourse and return to painfree sexual activity. Reason for Services/Other Comments:  · Patient continues to require skilled intervention due to above mentioned deficits. Use of outcome tool(s) and clinical judgement create a POC that gives a: Clear prediction of patient's progress: LOW COMPLEXITY            TREATMENT:   (In addition to Assessment/Re-Assessment sessions the following treatments were rendered)    Pre-treatment Symptoms/Complaints:  HAD SEX AFTER THERAPY AND NO \"WALL\" OR MUSCLE TIGHTNESS BUT DID FEEL PAIN DEEP INSIDE. FELT LIKE AN ABRASION. THEN, HAD SEX YESTERDAY AND THE WALL WAS BACK WITH PAIN REPORTED. Pain: Initial:   Pain Intensity 1: 0/10 Post Session:  0/10     THERAPEUTIC EXERCISE: (10 minutes):  Exercises per grid below to improve mobility, strength and coordination. Required moderate verbal and tactile cues to promote N PFM function. Progressed resistance, range and complexity of movement as indicated. Date:  3/27/2018     Activity/Exercise Parameters   Pelvic Floor Drops 2 minutes   Diaphragmatic Breathing 5 minutes   Adductor Stretch    Home Dilator Use    Estrogen Use/ Medication 3 minutes   HEP Discussed during MT; Drops, Adductor Stretch, Cat/Camel/Lateral Tilts 2-3 x daily     MANUAL THERAPY: (45 minutes): Soft tissue mobilization was utilized and necessary because of the patient's painful spasm and restricted motion of soft tissue.   (Used abbreviations: MET - muscle energy technique; SCS- Strain counter strain; CTM- Connective tissue mobilizations; CR- Contract/relax; SP- Sustained pressure, TrP- Trigger point release, IASTM- Instrument assisted soft tissue mobilizations, TDN- Trigger point dry needling)  - CTM  And IASTM to adductors  - CTM to ischiorectal fossa  - 1 finger MT to superficial and deep PFM (to include SCS, CR, and SP); L and R side  - XL, XXL vaginismus dilator      Treatment/Session Assessment:    · Response to Treatment:  No pain with dilators today, but pain reported with 1 finger palpation and treatment of PFM, primarily pubococcygeus and midline. Patient could feel the pain at the vestibule with MT, which she does not normally. Spent some time discussing use of estrogen both topically and inserted fully into the vagina. Hopefully, as her tissue irritation resolves, her muscles continue to improve as well    · Compliance with Program/Exercises: Compliant with HEP. · Recommendations/Intent for next treatment session: \"Next visit will focus on internal, external (adductors, suprapubic), XL/XXL dilator training\".     Total Treatment Duration: 55 minutes, 5 minutes roxann/doff clothing  PT Patient Time In/Time Out  Time In: 0955  Time Out: Gigi Khan PT

## 2018-04-03 ENCOUNTER — APPOINTMENT (OUTPATIENT)
Dept: PHYSICAL THERAPY | Age: 68
End: 2018-04-03
Attending: OBSTETRICS & GYNECOLOGY
Payer: MEDICARE

## 2018-04-06 ENCOUNTER — HOSPITAL ENCOUNTER (OUTPATIENT)
Dept: PHYSICAL THERAPY | Age: 68
Discharge: HOME | End: 2018-04-06
Attending: OBSTETRICS & GYNECOLOGY
Payer: MEDICARE

## 2018-04-06 PROCEDURE — 97140 MANUAL THERAPY 1/> REGIONS: CPT

## 2018-04-06 PROCEDURE — 97110 THERAPEUTIC EXERCISES: CPT

## 2018-04-06 NOTE — PROGRESS NOTES
Ronak Angulo  : 1950  [place] / [place] / Product Type: Medicare /  [address]  at [facility] [address]  [phone]   [phone]          OUTPATIENT PHYSICAL THERAPY:Daily Note 2018    ICD-10: Treatment Diagnosis: spasm of muscle (M62.838); lack of coordination (R27.8); dyspareunia (N94.1)  Precautions/Allergies:   Augmentin [amoxicillin-pot clavulanate]; Clindamycin; Linezolid; Nitrofurantoin macrocrystalline; and Vioxx [rofecoxib]   Fall Risk Score: 0 (? 5 = High Risk)  MD Orders: Evaluate and treat MEDICAL/REFERRING DIAGNOSIS:  Other specified dyspareunia [N94.19]   DATE OF ONSET: ~ 8 months ago  REFERRING PHYSICIAN: Felicity Scherer MD  RETURN PHYSICIAN APPOINTMENT: TBD     INITIAL ASSESSMENT:  Ms. Londa Saint presents with moderate pelvic floor muscle (PFM) over activity and lack of coordination, resulting in pain during intercourse. She also demonstrates weakness throughout her core musculature and pelvic girdle, as well as tightness in her proximal lower extremities. This is contributing to her difficulty with pain free penile penetration. I believe she will benefit from skilled PT, with an emphasis on manual therapy and muscle retraining, strength, and coordination to improve her ability to perform sexual activity without pain or difficulty. She is pleasant, motivated and eager to return to her prior level of function (PLOF). PROBLEM LIST (Impacting functional limitations):  1. Decreased Strength  2. Increased Pain  3. Decreased Flexibility/Joint Mobility  4. Decreased [place] with Home Exercise Program INTERVENTIONS PLANNED:  1. Electrical Stimulation  2. Heat  3. Home Exercise Program (HEP)  4. Manual Therapy  5. Therapeutic Exercise/Strengthening   TREATMENT PLAN:  Effective Dates: 2018 TO 2018.   Frequency/Duration: 1 time a week for 8 weeks    GOALS: (Goals have been discussed and agreed upon with patient.)    Short-Term Functional Goals: Time Frame: 4 weeks  Pt will demonstrate I with basic PFM HEP to improve awareness, coordination, and timing of PFM. (MET 3/8/2018)    Discharge Goals: Time Frame: 8 weeks  1. Pt with demonstrate normal voluntary relaxation of the pelvic floor muscle group to improve pelvic floor ROM and tolerate pain free penile penetration. 2. Pt will tolerate pain free 1 finger examination of PFM to transition to pain free intercourse in various positions. 3. Pt will report 0/10 pain with intercourse to return to her PLOF. Rehabilitation Potential For Stated Goals: Good                The information in this section was collected on 4/6/2018   (except where otherwise noted). HISTORY:   History of Present Injury/Illness (Reason for Referral):         Pt diagnosed with BV ~ 8 months ago, and then gradually started to notice symptoms (which have progressively worsened). Urinary: No complaints at this time. History of bladder suspension, which resolved the symptoms she was having in 1994. Bowel: Daily, with occasional pushing or straining. Sexual: 10/10 pain reported with attempts at full penile penetration. Described as \"something clamping shut\", \"hitting a wall\" and tearing or ripping. Marinoff 2 at this time. Pelvic Organ Prolapse/Pelvic Pain: Denies. Past Medical History/Comorbidities:   Ms. Danette Daley  has a past medical history of Asthma; Cervical spondylosis with radiculopathy (2/21/2017); Chronic pain; GERD (gastroesophageal reflux disease); Hyperlipidemia; Hypertension; Hypothyroid; Lupus; Rheumatoid arthritis ([address] Rosacea; and Vitamin D deficiency.   Ms. Danette Daley  has a past surgical history that includes hx appendectomy; hx bladder suspension; hx endoscopy (2012); hx cervical fusion (2017); hx colonoscopy (2012); hx salpingo-oophorectomy (Bilateral, 1996); hx knee arthroscopy (Right); hx orthopaedic (Left); hx other surgical; and hx total abdominal hysterectomy (1994). Social History/Living Environment:    Lives with spouse and 2 dogs. Prior Level of Function/Work/Activity:  Retired; Functionally I at this time. Current Medications:       Current Outpatient Prescriptions:     estradiol (ESTRACE) 0.01 % (0.1 mg/gram) vaginal cream, Insert 0.5 g into vagina daily. , Disp: 42.5 g, Rfl: 3    ciprofloxacin HCl (CIPRO) 500 mg tablet, Take 1 Tab by mouth two (2) times a day., Disp: 14 Tab, Rfl: 0    estradiol (ESTRACE) 1 mg tablet, Take 1 Tab by mouth daily. , Disp: 90 Tab, Rfl: 3    clobetasol (TEMOVATE) 0.05 % ointment, Apply  to affected area two (2) times a day., Disp: 15 g, Rfl: 0    ADVAIR DISKUS 500-50 mcg/dose diskus inhaler, USE 1 INHALATION TWICE A DAY, Disp: 180 Each, Rfl: 3    triamterene-hydroCHLOROthiazide (DYAZIDE) 37.5-25 mg per capsule, Take 1 Cap by mouth daily. , Disp: 90 Cap, Rfl: 3    levothyroxine (SYNTHROID) 112 mcg tablet, Take 1 Tab by mouth Daily (before breakfast). , Disp: 90 Tab, Rfl: 3    simvastatin (ZOCOR) 20 mg tablet, Take 1 Tab by mouth nightly. (Patient taking differently: Take 20 mg by mouth every morning.), Disp: 90 Tab, Rfl: 3    MONTELUKAST SODIUM (SINGULAIR PO), Take 20 mg by mouth every morning., Disp: , Rfl:     albuterol (VENTOLIN HFA) 90 mcg/actuation inhaler, Take 1 Puff by inhalation every four (4) hours as needed. Indications: bring on the dos, Disp: , Rfl:     RITUXIMAB (RITUXAN IV), by IntraVENous route. Indications: twice yearly, Disp: , Rfl:     conjugated estrogens (PREMARIN) 0.625 mg/gram vaginal cream, Insert 0.5 g into vagina daily as needed. , Disp: , Rfl:     ketoconazole (NIZORAL) 2 % shampoo, Apply  to affected area as needed. , Disp: , Rfl:    Date Last Reviewed:  4/6/2018   Number of Personal Factors/Comorbidities that affect the Plan of Care: 0: LOW COMPLEXITY   EXAMINATION:   Palpation:          Via vaginal canal: L superficial transverse perineal tender to palpation (TTP) with tearing. R pubococcygeus, B iliococcygeus and B obturator internus (OI) TTP with wincing and reproduction of              dyspareunia. Adductors: moderate connective tissue (CT) restrictions with pain and wincing proximally. Strength:  Via vaginal canal        P: Power, E: Endurance, R: Repetitions, QF: Quick Flicks, TrA: Transverse Abdominus, DB: Diaphragmatic Breathing  P 2/5, though  Unable to fully assess due to overactivity. Moderate delay in relaxation   E NT due to active pain in PFM   R NT due to active pain in PFM   QF NT due to active pain in PFM   TrA Unable to coordinate; bulging noted   DB Normal abdominal expansion, but no PFM descent        Body Structures Involved:  1. Muscles Body Functions Affected:  1. Neuromusculoskeletal  2. Movement Related Activities and Participation Affected:  1. Self Care  2. Interpersonal Interactions and Relationships   Number of elements (examined above) that affect the Plan of Care: 3: MODERATE COMPLEXITY   CLINICAL PRESENTATION:   Presentation: Stable and uncomplicated: LOW COMPLEXITY   CLINICAL DECISION MAKING:   Outcome Measure: Tool Used: Pain Disability Index  Score:  Initial: 2/23/2018 (10/70- but 10/10 for sexual activity) Most Recent: TBD   Interpretation of Score: The rating scale measures the degree to which aspects of your life are disrupted by chronic pain. For each of the 7 categories, patient circles the level of disability they experience 0 to 10. 0 signifies no disability at all, 10 signifies that these activities are totally disrupted by pain. ?   Self Care:     - CURRENT STATUS: CJ - 20%-39% impaired, limited or restricted    - GOAL STATUS:  - 0% impaired, limited or restricted    - D/C STATUS:  ---------------To be determined---------------    Medical Necessity:   · Patient is expected to demonstrate progress in range of motion, coordination and functional technique to eliminate pain during intercourse and return to painfree sexual activity. Reason for Services/Other Comments:  · Patient continues to require skilled intervention due to above mentioned deficits. Use of outcome tool(s) and clinical judgement create a POC that gives a: Clear prediction of patient's progress: LOW COMPLEXITY            TREATMENT:   (In addition to Assessment/Re-Assessment sessions the following treatments were rendered)    Pre-treatment Symptoms/Complaints:  Traveled to and from [place] this past week. Had intercourse once and it was \"the most normal sex\" we have had in a while. No wall or pain to report. Did not consistently use estrogen or topical steroid while on vacation. Pain: Initial:   Pain Intensity 1: 0/10 Post Session:  0/10     THERAPEUTIC EXERCISE: (10 minutes):  Exercises per grid below to improve mobility, strength and coordination. Required moderate verbal and tactile cues to promote N PFM function. Progressed resistance, range and complexity of movement as indicated. Date:  4/6/2018     Activity/Exercise Parameters   Pelvic Floor Drops 2 minutes   Diaphragmatic Breathing 5 minutes   Adductor Stretch    Home Dilator Use 3 minutes (XL CMT Dilator)   Estrogen Use/ Medication    HEP Discussed during MT; Drops, Adductor Stretch, Cat/Camel/Lateral Tilts 2-3 x daily     MANUAL THERAPY: (43 minutes): Soft tissue mobilization was utilized and necessary because of the patient's painful spasm and restricted motion of soft tissue.   (Used abbreviations: MET - muscle energy technique; SCS- Strain counter strain; CTM- Connective tissue mobilizations; CR- Contract/relax; SP- Sustained pressure, TrP- Trigger point release, IASTM- Instrument assisted soft tissue mobilizations, TDN- Trigger point dry needling)  - CTM  And IASTM to adductors  - CTM to ischiorectal fossa  - 1 finger MT to superficial and deep PFM (to include SCS, CR, and SP); L and R side  - XL vaginismus dilator and XL CMT Dilator      Treatment/Session Assessment:    · Response to Treatment:  Slight increase in irritation at the superficial PFM, which may be due to possible UTI or inconsistent estrogen use. However, deep PFM with minimal discomfort. Initiated dilator training with CMT dilators and pt tolerated well with 0/10 discomfort (only reported the sensation of fullness). Progressing well at this time and has achieved Discharge Goal 3, though inconsistently at this time. · Compliance with Program/Exercises: Compliant with HEP. · Recommendations/Intent for next treatment session: \"Next visit will focus on internal, external (adductors, suprapubic), XL/XXL dilator training\".     Total Treatment Duration: 53 minutes, 2 minutes roxann/doff clothing  PT Patient Time In/Time Out  Time In: 1100  Time Out: [facility], PT

## 2018-04-10 ENCOUNTER — HOSPITAL ENCOUNTER (OUTPATIENT)
Dept: PHYSICAL THERAPY | Age: 68
Discharge: HOME | End: 2018-04-10
Attending: OBSTETRICS & GYNECOLOGY
Payer: MEDICARE

## 2018-04-10 PROCEDURE — 97140 MANUAL THERAPY 1/> REGIONS: CPT

## 2018-04-10 PROCEDURE — 97110 THERAPEUTIC EXERCISES: CPT

## 2018-04-10 NOTE — PROGRESS NOTES
João Diaz  : 1950  [place] / Product Type: Medicare /  [address] [place]  at [facility] [address]  [phone]   [phone]          OUTPATIENT PHYSICAL THERAPY:Daily Note 4/10/2018    ICD-10: Treatment Diagnosis: spasm of muscle (M62.838); lack of coordination (R27.8); dyspareunia (N94.1)  Precautions/Allergies:   Augmentin [amoxicillin-pot clavulanate]; Clindamycin; Linezolid; Nitrofurantoin macrocrystalline; and Vioxx [rofecoxib]   Fall Risk Score: 0 (? 5 = High Risk)  MD Orders: Evaluate and treat MEDICAL/REFERRING DIAGNOSIS:  Other specified dyspareunia [N94.19]   DATE OF ONSET: ~ 8 months ago  REFERRING PHYSICIAN: Omar Hannah MD  RETURN PHYSICIAN APPOINTMENT: TBD     INITIAL ASSESSMENT:  Ms. Rashid Zamora presents with moderate pelvic floor muscle (PFM) over activity and lack of coordination, resulting in pain during intercourse. She also demonstrates weakness throughout her core musculature and pelvic girdle, as well as tightness in her proximal lower extremities. This is contributing to her difficulty with pain free penile penetration. I believe she will benefit from skilled PT, with an emphasis on manual therapy and muscle retraining, strength, and coordination to improve her ability to perform sexual activity without pain or difficulty. She is pleasant, motivated and eager to return to her prior level of function (PLOF). PROBLEM LIST (Impacting functional limitations):  1. Decreased Strength  2. Increased Pain  3. Decreased Flexibility/Joint Mobility  4. Decreased [place] with Home Exercise Program INTERVENTIONS PLANNED:  1. Electrical Stimulation  2. Heat  3. Home Exercise Program (HEP)  4. Manual Therapy  5. Therapeutic Exercise/Strengthening   TREATMENT PLAN:  Effective Dates: 4/10/2018 TO 2018.   Frequency/Duration: 1 time a week for 8 weeks    GOALS: (Goals have been discussed and agreed upon with patient.)    Short-Term Functional Goals: Time Frame: 4 weeks  Pt will demonstrate I with basic PFM HEP to improve awareness, coordination, and timing of PFM. (MET 3/8/2018)    Discharge Goals: Time Frame: 8 weeks  1. Pt with demonstrate normal voluntary relaxation of the pelvic floor muscle group to improve pelvic floor ROM and tolerate pain free penile penetration. 2. Pt will tolerate pain free 1 finger examination of PFM to transition to pain free intercourse in various positions. (MET 4/10/2018)  3. Pt will report 0/10 pain with intercourse to return to her PLOF. Rehabilitation Potential For Stated Goals: Good                The information in this section was collected on 4/10/2018   (except where otherwise noted). HISTORY:   History of Present Injury/Illness (Reason for Referral):         Pt diagnosed with BV ~ 8 months ago, and then gradually started to notice symptoms (which have progressively worsened). Urinary: No complaints at this time. History of bladder suspension, which resolved the symptoms she was having in 1994. Bowel: Daily, with occasional pushing or straining. Sexual: 10/10 pain reported with attempts at full penile penetration. Described as \"something clamping shut\", \"hitting a wall\" and tearing or ripping. Marinoff 2 at this time. Pelvic Organ Prolapse/Pelvic Pain: Denies. Past Medical History/Comorbidities:   Ms. Isiah Humphrey  has a past medical history of Asthma; Cervical spondylosis with radiculopathy (2/21/2017); Chronic pain; GERD (gastroesophageal reflux disease); Hyperlipidemia; Hypertension; Hypothyroid; Lupus; Rheumatoid arthritis ([address] Rosacea; and Vitamin D deficiency.   Ms. Isiha Humphrey  has a past surgical history that includes hx appendectomy; hx bladder suspension; hx endoscopy (2012); hx cervical fusion (2017); hx colonoscopy (2012); hx salpingo-oophorectomy (Bilateral, 1996); hx knee arthroscopy (Right); hx orthopaedic (Left); hx other surgical; and hx total abdominal hysterectomy (1994). Social History/Living Environment:    Lives with spouse and 2 dogs. Prior Level of Function/Work/Activity:  Retired; Functionally I at this time. Current Medications:       Current Outpatient Prescriptions:     estradiol (ESTRACE) 0.01 % (0.1 mg/gram) vaginal cream, Insert 0.5 g into vagina daily. , Disp: 42.5 g, Rfl: 3    ciprofloxacin HCl (CIPRO) 500 mg tablet, Take 1 Tab by mouth two (2) times a day., Disp: 14 Tab, Rfl: 0    estradiol (ESTRACE) 1 mg tablet, Take 1 Tab by mouth daily. , Disp: 90 Tab, Rfl: 3    clobetasol (TEMOVATE) 0.05 % ointment, Apply  to affected area two (2) times a day., Disp: 15 g, Rfl: 0    ADVAIR DISKUS 500-50 mcg/dose diskus inhaler, USE 1 INHALATION TWICE A DAY, Disp: 180 Each, Rfl: 3    triamterene-hydroCHLOROthiazide (DYAZIDE) 37.5-25 mg per capsule, Take 1 Cap by mouth daily. , Disp: 90 Cap, Rfl: 3    levothyroxine (SYNTHROID) 112 mcg tablet, Take 1 Tab by mouth Daily (before breakfast). , Disp: 90 Tab, Rfl: 3    simvastatin (ZOCOR) 20 mg tablet, Take 1 Tab by mouth nightly. (Patient taking differently: Take 20 mg by mouth every morning.), Disp: 90 Tab, Rfl: 3    MONTELUKAST SODIUM (SINGULAIR PO), Take 20 mg by mouth every morning., Disp: , Rfl:     albuterol (VENTOLIN HFA) 90 mcg/actuation inhaler, Take 1 Puff by inhalation every four (4) hours as needed. Indications: bring on the dos, Disp: , Rfl:     RITUXIMAB (RITUXAN IV), by IntraVENous route. Indications: twice yearly, Disp: , Rfl:     conjugated estrogens (PREMARIN) 0.625 mg/gram vaginal cream, Insert 0.5 g into vagina daily as needed. , Disp: , Rfl:     ketoconazole (NIZORAL) 2 % shampoo, Apply  to affected area as needed. , Disp: , Rfl:    Date Last Reviewed:  4/10/2018   Number of Personal Factors/Comorbidities that affect the Plan of Care: 0: LOW COMPLEXITY   EXAMINATION:   Palpation:          Via vaginal canal: L superficial transverse perineal tender to palpation (TTP) with tearing. R pubococcygeus, B iliococcygeus and B obturator internus (OI) TTP with wincing and reproduction of              dyspareunia. Adductors: moderate connective tissue (CT) restrictions with pain and wincing proximally. Strength:  Via vaginal canal        P: Power, E: Endurance, R: Repetitions, QF: Quick Flicks, TrA: Transverse Abdominus, DB: Diaphragmatic Breathing  P 2/5, though  Unable to fully assess due to overactivity. Moderate delay in relaxation   E NT due to active pain in PFM   R NT due to active pain in PFM   QF NT due to active pain in PFM   TrA Unable to coordinate; bulging noted   DB Normal abdominal expansion, but no PFM descent        Body Structures Involved:  1. Muscles Body Functions Affected:  1. Neuromusculoskeletal  2. Movement Related Activities and Participation Affected:  1. Self Care  2. Interpersonal Interactions and Relationships   Number of elements (examined above) that affect the Plan of Care: 3: MODERATE COMPLEXITY   CLINICAL PRESENTATION:   Presentation: Stable and uncomplicated: LOW COMPLEXITY   CLINICAL DECISION MAKING:   Outcome Measure: Tool Used: Pain Disability Index  Score:  Initial: 2/23/2018 (10/70- but 10/10 for sexual activity) Most Recent: TBD   Interpretation of Score: The rating scale measures the degree to which aspects of your life are disrupted by chronic pain. For each of the 7 categories, patient circles the level of disability they experience 0 to 10. 0 signifies no disability at all, 10 signifies that these activities are totally disrupted by pain. ?   Self Care:     - CURRENT STATUS: CJ - 20%-39% impaired, limited or restricted    - GOAL STATUS:  - 0% impaired, limited or restricted    - D/C STATUS:  ---------------To be determined---------------    Medical Necessity:   · Patient is expected to demonstrate progress in range of motion, coordination and functional technique to eliminate pain during intercourse and return to painfree sexual activity. Reason for Services/Other Comments:  · Patient continues to require skilled intervention due to above mentioned deficits. Use of outcome tool(s) and clinical judgement create a POC that gives a: Clear prediction of patient's progress: LOW COMPLEXITY            TREATMENT:   (In addition to Assessment/Re-Assessment sessions the following treatments were rendered)    Pre-treatment Symptoms/Complaints:  Was able to be intimate this past week and didn't notice the wall. Some mild tightness, but able to get past it. Did note some tearing during and after, but no > 3/10. Pain: Initial:   Pain Intensity 1: 0/10 Post Session:  0/10     THERAPEUTIC EXERCISE: (10 minutes):  Exercises per grid below to improve mobility, strength and coordination. Required moderate verbal and tactile cues to promote N PFM function. Progressed resistance, range and complexity of movement as indicated. Date:  4/10/2018     Activity/Exercise Parameters   Pelvic Floor Drops 2 minutes   Diaphragmatic Breathing 4 minutes   Adductor Stretch    Hip Flexor Stretch 4 minutes (EOM)   Estrogen Use/ Medication    HEP Discussed during MT; Drops, Adductor Stretch, Cat/Camel/Lateral Tilts 2-3 x daily     MANUAL THERAPY: (45 minutes): Soft tissue mobilization was utilized and necessary because of the patient's painful spasm and restricted motion of soft tissue.   (Used abbreviations: MET - muscle energy technique; SCS- Strain counter strain; CTM- Connective tissue mobilizations; CR- Contract/relax; SP- Sustained pressure, TrP- Trigger point release, IASTM- Instrument assisted soft tissue mobilizations, TDN- Trigger point dry needling)  - CTM  And IASTM to adductors  - CTM to ischiorectal fossa  - 1 finger MT to superficial and deep PFM (to include SCS, CR, and SP); L and R side  - XL, XXL vaginismus dilator       Treatment/Session Assessment:    · Response to Treatment:  Improvements in tissue quality today, possibly due to resuming medication regimine. Pain free 1 finger and dilator use this date, achieving Discharge Goal 2. Only mild tightness noted with XXL dilator. Progressing excellently and will decrease frequency to once every two week sin preparation for D/C. · Compliance with Program/Exercises: Compliant with HEP. · Recommendations/Intent for next treatment session: \"Next visit will focus on internal, external (adductors, suprapubic), XL/XXL dilator training\".     Total Treatment Duration: 55 minutes, 2 minutes roxann/doff clothing  PT Patient Time In/Time Out  Time In: 1000  Time Out: [id number] [place], PT

## 2018-04-24 ENCOUNTER — HOSPITAL ENCOUNTER (OUTPATIENT)
Dept: PHYSICAL THERAPY | Age: 68
Discharge: HOME | End: 2018-04-24
Attending: OBSTETRICS & GYNECOLOGY
Payer: MEDICARE

## 2018-04-24 PROCEDURE — G8987 SELF CARE CURRENT STATUS: HCPCS

## 2018-04-24 PROCEDURE — 97140 MANUAL THERAPY 1/> REGIONS: CPT

## 2018-04-24 PROCEDURE — 97110 THERAPEUTIC EXERCISES: CPT

## 2018-04-24 PROCEDURE — G8988 SELF CARE GOAL STATUS: HCPCS

## 2018-04-24 NOTE — THERAPY RECERTIFICATION
Isi Rashid  : 1950  [place] / [place] / Product Type: Medicare /  [address]  at [facility] [address]  [phone]   [phone]          OUTPATIENT PHYSICAL THERAPY:Daily Note, Progress Report and Recertification     ICD-10: Treatment Diagnosis: spasm of muscle (M62.838); lack of coordination (R27.8); dyspareunia (N94.1)  Precautions/Allergies:   Augmentin [amoxicillin-pot clavulanate]; Clindamycin; Linezolid; Nitrofurantoin macrocrystalline; and Vioxx [rofecoxib]   Fall Risk Score: 0 (? 5 = High Risk)  MD Orders: Evaluate and treat MEDICAL/REFERRING DIAGNOSIS:  Other specified dyspareunia [N94.19]   DATE OF ONSET: ~ 8 months ago  REFERRING PHYSICIAN: Ricci Roberts MD  RETURN PHYSICIAN APPOINTMENT: TBD     RECERTIFICATION/PROGRESS NOTE SUMMARY 2018: Tony Allred has been seen for 8 sessions in Pelvic PT since 2018. She has made dramatic improvements in her PFM function, primarily coordination and voluntary relaxation ; as well as her tolerance for internal manual therapy. This combined has contributed to dramatic improvements in her ability to have intercourse with less pain. In the course of the past 2 months, she has been able to have pain free intercourse a few times and has been provided with dilators for home use prior to intercourse. She is aware of the psychological guarding that occurs in preparation for vaginal penetration, but manages this well with diaphragmatic breathing, stretching and pelvic floor relaxation exercises. She does continue to have some slight tearing at the opening of the vestibule with penetration, but is treating that with a topical medication prescribed by her MD. She also continues to have mild-moderate tenderness in her levator ani mm.  Because of this, I believe she will benefit from 1-3 additional sessions of PT to solidify dilator home use and minimize/eliminate tenderness in PFM. She is pleasant, motivated and compliant. INITIAL ASSESSMENT:  Ms. Bluford Gowers presents with moderate pelvic floor muscle (PFM) over activity and lack of coordination, resulting in pain during intercourse. She also demonstrates weakness throughout her core musculature and pelvic girdle, as well as tightness in her proximal lower extremities. This is contributing to her difficulty with pain free penile penetration. I believe she will benefit from skilled PT, with an emphasis on manual therapy and muscle retraining, strength, and coordination to improve her ability to perform sexual activity without pain or difficulty. She is pleasant, motivated and eager to return to her prior level of function (PLOF). PROBLEM LIST (Impacting functional limitations):  1. Decreased Strength  2. Increased Pain  3. Decreased Flexibility/Joint Mobility  4. Decreased [place] with Home Exercise Program INTERVENTIONS PLANNED:  1. Electrical Stimulation  2. Heat  3. Home Exercise Program (HEP)  4. Manual Therapy  5. Therapeutic Exercise/Strengthening   TREATMENT PLAN:  Effective Dates: 4/24/2018 TO 6/5/2018. Frequency/Duration: Every two weeks    GOALS: (Goals have been discussed and agreed upon with patient.)    Short-Term Functional Goals: Time Frame: 4 weeks  Pt will demonstrate I with basic PFM HEP to improve awareness, coordination, and timing of PFM. (MET 3/8/2018)    Discharge Goals: Time Frame: 6 weeks  1. Pt with demonstrate normal voluntary relaxation of the pelvic floor muscle group to improve pelvic floor ROM and tolerate pain free penile penetration. (MET 4/24/2018)  2. Pt will tolerate pain free 1 finger examination of PFM to transition to pain free intercourse in various positions. (MET 4/10/2018)  3.  Pt will report 0/10 pain with intercourse to return to her PLOF. (ONGOING)    Rehabilitation Potential For Stated Goals: Good    Regarding Shannan Smiley therapy, I certify that the treatment plan above will be carried out by a therapist or under their direction. Thank you for this referral,  Trevor Taylor, PT       Referring Physician Signature: Seema Cano MD             ______________________________ Date                         The information in this section was collected on 2/23/2018   (except where otherwise noted). HISTORY:   History of Present Injury/Illness (Reason for Referral):         Pt diagnosed with BV ~ 8 months ago, and then gradually started to notice symptoms (which have progressively worsened). Urinary: No complaints at this time. History of bladder suspension, which resolved the symptoms she was having in 1994. Bowel: Daily, with occasional pushing or straining. Sexual: 10/10 pain reported with attempts at full penile penetration. Described as \"something clamping shut\", \"hitting a wall\" and tearing or ripping. Marinoff 2 at this time. Pelvic Organ Prolapse/Pelvic Pain: Denies. RECERTIFICATION/DISCHARGE SUMMARY 4/24/2018: Has been able to have pain free intercourse several times in the past few months, but then there are times when pain is present (best 0/10, worst 6-7/10). Continued reports of tearing at the vestibule of the vagina, but the cramping and \"hitting a wall\" is much better. Past Medical History/Comorbidities:   Ms. Michael Steiner  has a past medical history of Asthma; Cervical spondylosis with radiculopathy (2/21/2017); Chronic pain; GERD (gastroesophageal reflux disease); Hyperlipidemia; Hypertension; Hypothyroid; Lupus; Rheumatoid arthritis ([facility] [address] Rosacea; and Vitamin D deficiency. Ms. Michael Steiner  has a past surgical history that includes hx appendectomy; hx bladder suspension; hx endoscopy (2012); hx cervical fusion (2017); hx colonoscopy (2012); hx salpingo-oophorectomy (Bilateral, 1996); hx knee arthroscopy (Right); hx orthopaedic (Left); hx other surgical; and hx total abdominal hysterectomy (1994).   Social History/Living Environment:    Lives with spouse and 2 dogs. Prior Level of Function/Work/Activity:  Retired; Functionally I at this time. Current Medications:       Current Outpatient Prescriptions:     estradiol (ESTRACE) 0.01 % (0.1 mg/gram) vaginal cream, Insert 0.5 g into vagina daily. , Disp: 42.5 g, Rfl: 3    ciprofloxacin HCl (CIPRO) 500 mg tablet, Take 1 Tab by mouth two (2) times a day., Disp: 14 Tab, Rfl: 0    estradiol (ESTRACE) 1 mg tablet, Take 1 Tab by mouth daily. , Disp: 90 Tab, Rfl: 3    clobetasol (TEMOVATE) 0.05 % ointment, Apply  to affected area two (2) times a day., Disp: 15 g, Rfl: 0    ADVAIR DISKUS 500-50 mcg/dose diskus inhaler, USE 1 INHALATION TWICE A DAY, Disp: 180 Each, Rfl: 3    triamterene-hydroCHLOROthiazide (DYAZIDE) 37.5-25 mg per capsule, Take 1 Cap by mouth daily. , Disp: 90 Cap, Rfl: 3    levothyroxine (SYNTHROID) 112 mcg tablet, Take 1 Tab by mouth Daily (before breakfast). , Disp: 90 Tab, Rfl: 3    simvastatin (ZOCOR) 20 mg tablet, Take 1 Tab by mouth nightly. (Patient taking differently: Take 20 mg by mouth every morning.), Disp: 90 Tab, Rfl: 3    MONTELUKAST SODIUM (SINGULAIR PO), Take 20 mg by mouth every morning., Disp: , Rfl:     albuterol (VENTOLIN HFA) 90 mcg/actuation inhaler, Take 1 Puff by inhalation every four (4) hours as needed. Indications: bring on the dos, Disp: , Rfl:     RITUXIMAB (RITUXAN IV), by IntraVENous route. Indications: twice yearly, Disp: , Rfl:     conjugated estrogens (PREMARIN) 0.625 mg/gram vaginal cream, Insert 0.5 g into vagina daily as needed. , Disp: , Rfl:     ketoconazole (NIZORAL) 2 % shampoo, Apply  to affected area as needed. , Disp: , Rfl:    Date Last Reviewed:  4/24/2018   Number of Personal Factors/Comorbidities that affect the Plan of Care: 0: LOW COMPLEXITY   EXAMINATION:   UPDATED 4/24/2018-   Palpation:          Via vaginal canal: Perineal body/posterior forchette tender to palpation (TTP) with tearing.  B iliococcygeus slightly tight and tender to palpation with the sensation of tightness/clamping shut during       intercourse. Adductors: mild connective tissue (CT) restrictions with minimal discomfort noted proximally. Strength:  Via vaginal canal        P: Power, E: Endurance, R: Repetitions, QF: Quick Flicks, TrA: Transverse Abdominus, DB: Diaphragmatic Breathing  P 2+/5, though  Unable to fully assess due to mild overactivity. Minimal delay in relaxation   E NT due to active pain in PFM   R NT due to active pain in PFM   QF NT due to active pain in PFM   TrA Unable to coordinate; bulging noted   DB Normal abdominal expansion, with PFM descent        Body Structures Involved:  1. Muscles Body Functions Affected:  1. Neuromusculoskeletal  2. Movement Related Activities and Participation Affected:  1. Self Care  2. Interpersonal Interactions and Relationships   Number of elements (examined above) that affect the Plan of Care: 3: MODERATE COMPLEXITY   CLINICAL PRESENTATION:   Presentation: Stable and uncomplicated: LOW COMPLEXITY   CLINICAL DECISION MAKING:   Outcome Measure: Tool Used: Pain Disability Index  Score:  Initial: 2/23/2018 (10/70- but 10/10 for sexual activity) Most Recent: 4/24/2018 (2/70- Only completed sexual activity)   Interpretation of Score: The rating scale measures the degree to which aspects of your life are disrupted by chronic pain. For each of the 7 categories, patient circles the level of disability they experience 0 to 10. 0 signifies no disability at all, 10 signifies that these activities are totally disrupted by pain. ?   Self Care:     - CURRENT STATUS: CI - 1%-19% impaired, limited or restricted    - GOAL STATUS: CH - 0% impaired, limited or restricted    - D/C STATUS:  ---------------To be determined---------------    Medical Necessity:   · Patient is expected to demonstrate progress in range of motion, coordination and functional technique to eliminate pain during intercourse and return to painfree sexual activity. Reason for Services/Other Comments:  · Patient continues to require skilled intervention due to above mentioned deficits. Use of outcome tool(s) and clinical judgement create a POC that gives a: Clear prediction of patient's progress: LOW COMPLEXITY            TREATMENT:   (In addition to Assessment/Re-Assessment sessions the following treatments were rendered)    Pre-treatment Symptoms/Complaints:  Fell 2 weeks ago and needed 4 stiches in her foot, so noticing it is harder to relax the PFM group lately. Had sex Sunday and rated as 6/10 (tearing at the opening), but hasn't been as consistent with HEP and estrogen. Pain: Initial:   Pain Intensity 1: 0/10 Post Session:  0/10     THERAPEUTIC EXERCISE: (10 minutes):  Exercises per grid below to improve mobility, strength and coordination. Required moderate verbal and tactile cues to promote N PFM function. Progressed resistance, range and complexity of movement as indicated. Date:  4/24/2018     Activity/Exercise Parameters   Pelvic Floor Drops 2 minutes   Diaphragmatic Breathing 4 minutes   Adductor Stretch    Hip Flexor Stretch    PT POC 4 minutes   HEP Discussed during MT; Drops, Adductor Stretch, Cat/Camel/Lateral Tilts 2-3 x daily     MANUAL THERAPY: (45 minutes): Soft tissue mobilization was utilized and necessary because of the patient's painful spasm and restricted motion of soft tissue.   (Used abbreviations: MET - muscle energy technique; SCS- Strain counter strain; CTM- Connective tissue mobilizations; CR- Contract/relax; SP- Sustained pressure, TrP- Trigger point release, IASTM- Instrument assisted soft tissue mobilizations, TDN- Trigger point dry needling)  - CTM and IASTM to adductors  - CTM to ischiorectal fossa  - 1 finger MT to superficial and deep PFM (to include SCS, CR, and SP); L and R side  - L and XL vaginismus dilator       Treatment/Session Assessment:    · Response to Treatment:  Continues to be a good candidate for skilled PT.     · Compliance with Program/Exercises: Compliant with HEP.      · Recommendations for next visit: XXL dilator    Total Treatment Duration: 55 minutes, 5 minutes roxann/doff clothing  PT Patient Time In/Time Out  Time In: 1000  Time Out: Rico Angel PT

## 2018-05-08 ENCOUNTER — HOSPITAL ENCOUNTER (OUTPATIENT)
Dept: PHYSICAL THERAPY | Age: 68
Discharge: HOME | End: 2018-05-08
Attending: OBSTETRICS & GYNECOLOGY
Payer: MEDICARE

## 2018-05-08 PROCEDURE — G8989 SELF CARE D/C STATUS: HCPCS

## 2018-05-08 PROCEDURE — 97110 THERAPEUTIC EXERCISES: CPT

## 2018-05-08 PROCEDURE — G8988 SELF CARE GOAL STATUS: HCPCS

## 2018-05-08 PROCEDURE — 97140 MANUAL THERAPY 1/> REGIONS: CPT

## 2018-05-08 NOTE — THERAPY DISCHARGE
Dina Davis  : 1950  [place] / [place] / Product Type: Medicare /  [address] at [facility]  [phone]   [phone]          OUTPATIENT PHYSICAL THERAPY:Daily Note and Discharge 2018    ICD-10: Treatment Diagnosis: spasm of muscle (M62.838); lack of coordination (R27.8); dyspareunia (N94.1)  Precautions/Allergies:   Augmentin [amoxicillin-pot clavulanate]; Clindamycin; Linezolid; Nitrofurantoin macrocrystalline; and Vioxx [rofecoxib]   Fall Risk Score: 0 (? 5 = High Risk)  MD Orders: Evaluate and treat MEDICAL/REFERRING DIAGNOSIS:  Other specified dyspareunia [N94.19]   DATE OF ONSET: ~ 8 months ago  REFERRING PHYSICIAN: Andie Rasheed MD  RETURN PHYSICIAN APPOINTMENT: TBD     DISCHARGE 2018: Patient and therapist feel she has the tools to manage her symptoms at home. All questions have been answered to her satisfaction and she is pleased with the course of skilled PT received. RECERTIFICATION/PROGRESS NOTE SUMMARY 2018: Ila Dumont has been seen for 8 sessions in Pelvic PT since 2018. She has made dramatic improvements in her PFM function, primarily coordination and voluntary relaxation ; as well as her tolerance for internal manual therapy. This combined has contributed to dramatic improvements in her ability to have intercourse with less pain. In the course of the past 2 months, she has been able to have pain free intercourse a few times and has been provided with dilators for home use prior to intercourse. She is aware of the psychological guarding that occurs in preparation for vaginal penetration, but manages this well with diaphragmatic breathing, stretching and pelvic floor relaxation exercises.  She does continue to have some slight tearing at the opening of the vestibule with penetration, but is treating that with a topical medication prescribed by her MD. She also continues to have mild-moderate tenderness in her levator ani mm. Because of this, I believe she will benefit from 1-3 additional sessions of PT to solidify dilator home use and minimize/eliminate tenderness in PFM. She is pleasant, motivated and compliant. INITIAL ASSESSMENT:  Ms. Elsy Camargo presents with moderate pelvic floor muscle (PFM) over activity and lack of coordination, resulting in pain during intercourse. She also demonstrates weakness throughout her core musculature and pelvic girdle, as well as tightness in her proximal lower extremities. This is contributing to her difficulty with pain free penile penetration. I believe she will benefit from skilled PT, with an emphasis on manual therapy and muscle retraining, strength, and coordination to improve her ability to perform sexual activity without pain or difficulty. She is pleasant, motivated and eager to return to her prior level of function (PLOF). PROBLEM LIST (Impacting functional limitations):  1. Decreased Strength  2. Increased Pain  3. Decreased Flexibility/Joint Mobility  4. Decreased [place] with Home Exercise Program INTERVENTIONS PLANNED:  1. Electrical Stimulation  2. Heat  3. Home Exercise Program (HEP)  4. Manual Therapy  5. Therapeutic Exercise/Strengthening   TREATMENT PLAN:  Effective Dates: 5/8/2018 TO 6/5/2018. Frequency/Duration: Every two weeks    GOALS: (Goals have been discussed and agreed upon with patient.)    Short-Term Functional Goals: Time Frame: 4 weeks  Pt will demonstrate I with basic PFM HEP to improve awareness, coordination, and timing of PFM. (MET 3/8/2018)    Discharge Goals: Time Frame: 6 weeks  1. Pt with demonstrate normal voluntary relaxation of the pelvic floor muscle group to improve pelvic floor ROM and tolerate pain free penile penetration. (MET 4/24/2018)  2. Pt will tolerate pain free 1 finger examination of PFM to transition to pain free intercourse in various positions.  (MET 4/10/2018)  3. Pt will report 0/10 pain with intercourse to return to her PLOF. (MET INTERMITTENTLY 5/8/2018)    Rehabilitation Potential For Stated Goals: Good                  The information in this section was collected on 2/23/2018   (except where otherwise noted). HISTORY:   History of Present Injury/Illness (Reason for Referral):         Pt diagnosed with BV ~ 8 months ago, and then gradually started to notice symptoms (which have progressively worsened). Urinary: No complaints at this time. History of bladder suspension, which resolved the symptoms she was having in 1994. Bowel: Daily, with occasional pushing or straining. Sexual: 10/10 pain reported with attempts at full penile penetration. Described as \"something clamping shut\", \"hitting a wall\" and tearing or ripping. Marinoff 2 at this time. Pelvic Organ Prolapse/Pelvic Pain: Denies. RECERTIFICATION/DISCHARGE SUMMARY 4/24/2018: Has been able to have pain free intercourse several times in the past few months, but then there are times when pain is present (best 0/10, worst 6-7/10). Continued reports of tearing at the vestibule of the vagina, but the cramping and \"hitting a wall\" is much better. Past Medical History/Comorbidities:   Ms. Doug Rangel  has a past medical history of Asthma; Cervical spondylosis with radiculopathy (2/21/2017); Chronic pain; GERD (gastroesophageal reflux disease); Hyperlipidemia; Hypertension; Hypothyroid; Lupus; Rheumatoid arthritis ([address] Rosacea; and Vitamin D deficiency. Ms. Doug Rangel  has a past surgical history that includes hx appendectomy; hx bladder suspension; hx endoscopy (2012); hx cervical fusion (2017); hx colonoscopy (2012); hx salpingo-oophorectomy (Bilateral, 1996); hx knee arthroscopy (Right); hx orthopaedic (Left); hx other surgical; and hx total abdominal hysterectomy (1994). Social History/Living Environment:    Lives with spouse and 2 dogs.   Prior Level of Function/Work/Activity:  Retired; Functionally I at this time. Current Medications:       Current Outpatient Prescriptions:     estradiol (ESTRACE) 0.01 % (0.1 mg/gram) vaginal cream, Insert 0.5 g into vagina daily. , Disp: 42.5 g, Rfl: 3    ciprofloxacin HCl (CIPRO) 500 mg tablet, Take 1 Tab by mouth two (2) times a day., Disp: 14 Tab, Rfl: 0    estradiol (ESTRACE) 1 mg tablet, Take 1 Tab by mouth daily. , Disp: 90 Tab, Rfl: 3    clobetasol (TEMOVATE) 0.05 % ointment, Apply  to affected area two (2) times a day., Disp: 15 g, Rfl: 0    ADVAIR DISKUS 500-50 mcg/dose diskus inhaler, USE 1 INHALATION TWICE A DAY, Disp: 180 Each, Rfl: 3    triamterene-hydroCHLOROthiazide (DYAZIDE) 37.5-25 mg per capsule, Take 1 Cap by mouth daily. , Disp: 90 Cap, Rfl: 3    levothyroxine (SYNTHROID) 112 mcg tablet, Take 1 Tab by mouth Daily (before breakfast). , Disp: 90 Tab, Rfl: 3    simvastatin (ZOCOR) 20 mg tablet, Take 1 Tab by mouth nightly. (Patient taking differently: Take 20 mg by mouth every morning.), Disp: 90 Tab, Rfl: 3    MONTELUKAST SODIUM (SINGULAIR PO), Take 20 mg by mouth every morning., Disp: , Rfl:     albuterol (VENTOLIN HFA) 90 mcg/actuation inhaler, Take 1 Puff by inhalation every four (4) hours as needed. Indications: bring on the dos, Disp: , Rfl:     RITUXIMAB (RITUXAN IV), by IntraVENous route. Indications: twice yearly, Disp: , Rfl:     conjugated estrogens (PREMARIN) 0.625 mg/gram vaginal cream, Insert 0.5 g into vagina daily as needed. , Disp: , Rfl:     ketoconazole (NIZORAL) 2 % shampoo, Apply  to affected area as needed. , Disp: , Rfl:    Date Last Reviewed:  5/8/2018   Number of Personal Factors/Comorbidities that affect the Plan of Care: 0: LOW COMPLEXITY   EXAMINATION:   UPDATED 5/8/2018-   Palpation:          Via vaginal canal: Perineal body/posterior forchette tender to palpation (TTP) with tearing. Nontender levator ani.         Adductors: mild connective tissue (CT) restrictions with minimal discomfort noted proximally. Strength:  Via vaginal canal        P: Power, E: Endurance, R: Repetitions, QF: Quick Flicks, TrA: Transverse Abdominus, DB: Diaphragmatic Breathing  P 2+/5, though  Unable to fully assess due to mild overactivity. Minimal delay in relaxation   E NT due to active pain in PFM   R NT due to active pain in PFM   QF NT due to active pain in PFM   TrA Unable to coordinate; bulging noted   DB Normal abdominal expansion, with PFM descent        Body Structures Involved:  1. Muscles Body Functions Affected:  1. Neuromusculoskeletal  2. Movement Related Activities and Participation Affected:  1. Self Care  2. Interpersonal Interactions and Relationships   Number of elements (examined above) that affect the Plan of Care: 3: MODERATE COMPLEXITY   CLINICAL PRESENTATION:   Presentation: Stable and uncomplicated: LOW COMPLEXITY   CLINICAL DECISION MAKING:   Outcome Measure: Tool Used: Pain Disability Index  Score:  Initial: 2/23/2018 (10/70- but 10/10 for sexual activity) Most Recent: 4/24/2018 (2/70- Only completed sexual activity)   Interpretation of Score: The rating scale measures the degree to which aspects of your life are disrupted by chronic pain. For each of the 7 categories, patient circles the level of disability they experience 0 to 10. 0 signifies no disability at all, 10 signifies that these activities are totally disrupted by pain. ? Self Care:     - CURRENT STATUS: CI - 1%-19% impaired, limited or restricted    - GOAL STATUS: CH - 0% impaired, limited or restricted    - D/C STATUS:  CI - 1%-19% impaired, limited or restricted    Medical Necessity:   · Patient is expected to demonstrate progress in range of motion, coordination and functional technique to eliminate pain during intercourse and return to painfree sexual activity. Reason for Services/Other Comments:  · Patient continues to require skilled intervention due to above mentioned deficits.    Use of outcome tool(s) and clinical judgement create a POC that gives a: Clear prediction of patient's progress: LOW COMPLEXITY            TREATMENT:   (In addition to Assessment/Re-Assessment sessions the following treatments were rendered)    Pre-treatment Symptoms/Complaints:  Has had intercourse twice. The first time there was a good bit of pain, the second time, she used the dilators prior and stated it was \"good\". Does have a good bit of stress at home with sister in law and her 3 dogs moving in. Pain: Initial:   Pain Intensity 1: 0/10 Post Session:  0/10     THERAPEUTIC EXERCISE: (10 minutes):  Exercises per grid below to improve mobility, strength and coordination. Required moderate verbal and tactile cues to promote N PFM function. Progressed resistance, range and complexity of movement as indicated. Date:  5/8/2018     Activity/Exercise Parameters   Pelvic Floor Drops 3 minutes   Diaphragmatic Breathing 2 minutes   Adductor Stretch    Hip Flexor Stretch    [place] Program 5 minutes   HEP Discussed during MT; Drops, Adductor Stretch, Cat/Camel/Lateral Tilts 2-3 x daily     MANUAL THERAPY: (45 minutes): Soft tissue mobilization was utilized and necessary because of the patient's painful spasm and restricted motion of soft tissue. (Used abbreviations: MET - muscle energy technique; SCS- Strain counter strain; CTM- Connective tissue mobilizations; CR- Contract/relax; SP- Sustained pressure, TrP- Trigger point release, IASTM- Instrument assisted soft tissue mobilizations, TDN- Trigger point dry needling)  - CTM and IASTM to adductors  - CTM to ischiorectal fossa  - 1 finger MT to superficial and deep PFM (to include SCS, CR, and SP); L and R side  - XL vaginismus dilator       Treatment/Session Assessment:    · Response to Treatment:  Patient and therapist feel she has the tools to manage her symptoms at home. All questions have been answered to her satisfaction and she is pleased with the course of skilled PT received. · Compliance with Program/Exercises: Compliant with HEP.        Total Treatment Duration: 55 minutes, 5 minutes roxann/doff clothing  PT Patient Time In/Time Out  Time In: 1000  Time Out: [facility], PT

## 2018-06-25 PROBLEM — M47.22 CERVICAL SPONDYLOSIS WITH RADICULOPATHY: Status: RESOLVED | Noted: 2017-02-21 | Resolved: 2018-06-25

## 2018-07-16 NOTE — THERAPY EVALUATION
Mer Oneill  : 1950  [place]  Secondary: [address], [address]  [phone]   [phone]        OUTPATIENT SPEECH LANGUAGE PATHOLOGY: MODIFIED BARIUM SWALLOW    ICD-10: Treatment Diagnosis: dysphagia, pharyngeal 13.13  DATE: 2018  REFERRING PHYSICIAN: Reji Martinez DO MD Orders: modified barium swallow study   PAST MEDICAL HISTORY:    Ms. Alexys Hart is a 78 y.o. female who  has a past medical history of Asthma; Cervical spondylosis with radiculopathy (2017); Chronic pain; GERD (gastroesophageal reflux disease); Hyperlipidemia; Hypertension; Hypothyroid; Lupus; Rheumatoid arthritis ([facility] [address] Rosacea; and Vitamin D deficiency. She also has no past medical history of Adverse effect of anesthesia; Difficult intubation; Malignant hyperthermia due to anesthesia; Nausea & vomiting; or Pseudocholinesterase deficiency. She also  has a past surgical history that includes hx appendectomy; hx bladder suspension; hx endoscopy (); hx cervical fusion (); hx colonoscopy (); hx salpingo-oophorectomy (Bilateral, ); hx knee arthroscopy (Right); hx orthopaedic (Left); hx other surgical; hx total abdominal hysterectomy (); and hx rotator cuff repair (Right, ). MEDICAL/REFERRING DIAGNOSIS: Hoarseness [R49.0]  Dysphagia, unspecified type [R13.10]  DATE OF ONSET: 5 years   PRIOR LEVEL OF FUNCTION: residing with spouse  PRECAUTIONS/ALLERGIES: Augmentin [amoxicillin-pot clavulanate]; Clindamycin; Linezolid; Nitrofurantoin macrocrystalline; and Vioxx [rofecoxib]   ASSESSMENT/PLAN OF CARE:  Pt reported choking with po which has been ongoing approximately 5 years. She reported this occurs once daily. She reported her voice has been \"getting rougher\" over the past 2-3 years.   She reported Dr. Rosibel Tai told her one of her vocal cords wasn't closing all the way which comes with age. Based on the objective data described below, the patient presents with mild-mod pharyngeal dysphagia. Premature spillage occurred to the valleculae with all consistencies. Penetration occurred during the swallow with thin and nectar thick liquids. Penetration reached the level of the vocal cords with no cough response with thin via straw. Penetration with thin via cup was deep. An eventual delayed cough observed. A chin tuck with thin liquids resulted in transient penetration x1 and eliminated it the other time. No pharyngeal residue observed. Recommend a regular diet with a chin tuck with liquids and no straws. Discussed with pt whom expressed understanding. Also recommend ST to address deficits. She reported living closer to [address]. Patient will benefit from skilled intervention to address the above impairments. RECOMMENDATIONS AND PLANNED INTERVENTIONS (Benefits and precautions of therapy have been discussed with the patient.):  · continue prescribed diet  Liquids:  regular thin   Chin tuck with liquids  No straws  MEDICATIONS:  · With liquid  COMPENSATORY STRATEGIES/MODIFICATIONS INCLUDING:  · Chin tuck with liquids  OTHER RECOMMENDATIONS (including follow up treatment recommendations):   · Laryngeal exercises    Thank you for this referral,  Loraine Andersen, RHONDA, CCC-SLP             SUBJECTIVE:  Pt cooperative. Present Symptoms: coughing with po       Current Dietary Status:  Regular    Radiologist: Dr. Lelia Moses  History of reflux:  []YES     []NO      Reflux medication:  Social History/Home Situation: residing with her boyfriend      Work/Activity History: retired     OBJECTIVE:  Objective Measure:   Tool Used: National Outcomes Measurement System: Functional Communication Measures: SWALLOWING  Score:  Initial: 5 Most Recent: X (Date: -- )   Interpretation of Tool: This measure describes the change in functional communication status subsequent to speech-language pathology treatment of patients with dysphagia.  o Level 1:  Individual is not able to swallow anything safely by mouth. All nutrition and hydration is received through non-oral means (e.g., nasogastric tube, PEG). o Level 2: Individual is not able to swallow safely by mouth for nutrition and hydration, but may take some consistency with consistent maximal cues in therapy only. Alternative method of feeding required. o Level 3:  Alternative method of feeding required as individual takes less than 50% of nutrition and hydration by mouth, and/or swallowing is safe with consistent use of moderate cues to use compensatory strategies and/or requires maximum diet restriction. o Level 4:  Swallowing is safe, but usually requires moderate cues to use compensatory strategies, and/or the individual has moderate diet restrictions and/or still requires tube feeding and/or oral supplements. o Level 5:  Swallowing is safe with minimal diet restriction and/or occasionally requires minimal cueing to use compensatory strategies. The individual may occasionally self-cue. All nutrition and hydration needs are met by mouth at mealtime. o Level 6:  Swallowing is safe, and the individual eats and drinks independently and may rarely require minimal cueing. The individual usually self-cues when difficulty occurs. May need to avoid specific food items (e.g., popcorn and nuts), or require additional time (due to dysphagia). o Level 7: The individuals ability to eat independently is not limited by swallow function. Swallowing would be safe and efficient for all consistencies. Compensatory strategies are effectively used when needed. Score Level 7 Level 6 Level 5 Level 4 Level 3 Level 2 Level 1   Modifier CH CI CJ CK CL CM CN   ?  Swallowing:     - CURRENT STATUS: CJ - 20%-39% impaired, limited or restricted    - GOAL STATUS:  CI - 1%-19% impaired, limited or restricted    - D/C STATUS:  CJ - 20%-39% impaired, limited or restricted      Cognitive/Communication Status:  Mental Status  Neurologic State: Alert    Oral Assessment:  Oral Assessment  Dentition: Intact, Natural  Oral Hygiene: adequate    Vocal Quality: min hoarseness    Patient Viewed: Patient Position: upright in chair  Film Views: Lateral, Fluoro    Oral Prepatory:  The patient was given the following: Consistency Presented: Honey thick liquid, Mixed consistency, Nectar thick liquid, Pudding, Solid, Thin liquid  How Presented: Self-fed/presented, SLP-fed/presented, Cup/sip, Spoon, Straw    Oral Phase:  Bolus Acceptance: No impairment  Bolus Formation/Control: No impairment  Propulsion: No impairment     Oral Residue: None  Initiation of Swallow: Triggered at vallecula  Oral Phase Severity: No impairment    Pharyngeal Phase:  Timing: Pooling 1-5 sec  Decreased Tongue Base Retraction?: No  Laryngeal Elevation: Incomplete laryngeal closure, Reduced excursion with laryngeal vestibule gap  Penetration: Flash/transient, During swallow, To cords, To laryngeal vestibule  Aspiration/Timing: No evidence of aspiration  Aspiration/Penetration Score: 5 (Penetration/Visible residue-Contrast contacts the folds, but is not ejected)   Pharyngeal Symmetry: Not assessed  Pharyngeal Dysfunction: Decreased elevation/closure  Pharyngeal Phase Severity: Mild moderate  Pharyngeal-Esophageal Segment: No impairment    Assessment/Reassessment only, no treatment provided today  __________________________________________________________________________________________________  Recommendations for treatment: laryngeal exercises   Total Treatment Duration:  Time In: 0840   Time Out: 0905    RHONDA Belcher, CCC-SLP

## 2018-07-17 ENCOUNTER — HOSPITAL ENCOUNTER (OUTPATIENT)
Dept: GENERAL RADIOLOGY | Age: 68
Discharge: HOME | End: 2018-07-17
Payer: MEDICARE

## 2018-07-17 DIAGNOSIS — R13.10 DYSPHAGIA, UNSPECIFIED TYPE: ICD-10-CM

## 2018-07-17 DIAGNOSIS — R49.0 HOARSENESS: ICD-10-CM

## 2018-07-17 PROCEDURE — G8998 SWALLOW D/C STATUS: HCPCS

## 2018-07-17 PROCEDURE — 74230 X-RAY XM SWLNG FUNCJ C+: CPT

## 2018-07-17 PROCEDURE — 74011000255 HC RX REV CODE- 255: Performed by: OTOLARYNGOLOGY

## 2018-07-17 PROCEDURE — G8996 SWALLOW CURRENT STATUS: HCPCS

## 2018-07-17 PROCEDURE — 92611 MOTION FLUOROSCOPY/SWALLOW: CPT

## 2018-07-17 PROCEDURE — G8997 SWALLOW GOAL STATUS: HCPCS

## 2018-07-17 RX ADMIN — BARIUM SULFATE 15 ML: 400 PASTE ORAL at 09:05

## 2018-07-17 RX ADMIN — BARIUM SULFATE 30 ML: 400 SUSPENSION ORAL at 09:10

## 2018-07-17 RX ADMIN — BARIUM SULFATE 45 ML: 980 POWDER, FOR SUSPENSION ORAL at 09:04

## 2018-07-17 RX ADMIN — BARIUM SULFATE 30 ML: 400 SUSPENSION ORAL at 09:05

## 2018-07-17 NOTE — PROGRESS NOTES
Outpatient Rehab Services  Referral Form/Physician Order  Central Scheduling Fax: [phone]  Speak to a :  Call [phone]   Please review and co-sign (electronically) OR sign and return to the below indicated clinic's fax number if you agree with this request.  Thank you! NAME:  Chayo Turpin SSN:  [id number] :  1950   ADDRESS:  [address] [place] [address]  [address] Daytime Phone:  [phone] ([facility])[phone] ([place] ???)    Diagnosis & Date of Onset:  Hoarseness [R49.0]  Dysphagia, unspecified type [R13.13] Special Precautions:   Frequency:  [] to be determined by therapist after evaluation   OR   ____ X per week X ____ weeks    Services    [x] Evaluate & Treat  [] Special Orders________________________   [] Physical Therapy  [] Occupational Therapy   [x] Speech Therapy  [] MBS w/ Speech Therapy    Specialized Programs    [] Aquatic Therapy [] Lymphedema [] Oncology Rehab   [] Balance Rehab [] Parkinson's Program [] Osteoporosis   [] Fibromyalgia [] Motion Analysis [] Spine Rehab   [] Industrial Rehab [] Hand & Upper Extremity [] Sports Injury Rehab    [] Urinary Incontinence     Locations    @ [address] [place] [address] 68, [address]  [facility] [address]  Ph: [phone]  Fax: [phone] @ [address] [place] [address] [address] [facility] [address]  [place], [address]  Ph: [phone]  Fax: [phone] @ [address], [address] [place] [address]  Ph: [phone]  Fax: [phone]        @ [address] [facility] [address]  Ph: [phone]  Fax: [phone] @ [address] [place] [address] Kaylie Ramirez, [address]   Ph: [phone]  Fax: [phone] @ [facility] [place] [address]  [address]  [place], [address]  Ph: [phone]  Fax: [phone]        @ [address] [place] [address]  [address] [facility] [address]  [place], [address] [place] [address]  Ph: [phone]  Fax: [phone] @ [address] [place] [address]  [facility] [address]  Ph: [phone]  Fax: [phone] @ [address], [address]  Georgia Das   Ph: [phone]         @ [address], [address] [place] [address]  Ph: [phone]  Fax: [phone]      This section is not needed if signing electronically   I certify that I have examined the above patient and outpatient rehab services are medically necessary.    ___________________________________________           Signature of Physician/Provider    ___________________________________________            Practice Name   ______________________   Date    ______________________   Referral Contact

## 2018-07-18 ENCOUNTER — APPOINTMENT (RX ONLY)
Dept: URBAN - METROPOLITAN AREA CLINIC 23 | Facility: CLINIC | Age: 68
Setting detail: DERMATOLOGY
End: 2018-07-18

## 2018-07-18 DIAGNOSIS — L21.8 OTHER SEBORRHEIC DERMATITIS: ICD-10-CM

## 2018-07-18 DIAGNOSIS — L57.0 ACTINIC KERATOSIS: ICD-10-CM

## 2018-07-18 DIAGNOSIS — L81.4 OTHER MELANIN HYPERPIGMENTATION: ICD-10-CM

## 2018-07-18 DIAGNOSIS — L82.1 OTHER SEBORRHEIC KERATOSIS: ICD-10-CM

## 2018-07-18 DIAGNOSIS — D22 MELANOCYTIC NEVI: ICD-10-CM

## 2018-07-18 PROBLEM — D22.5 MELANOCYTIC NEVI OF TRUNK: Status: ACTIVE | Noted: 2018-07-18

## 2018-07-18 PROBLEM — L70.0 ACNE VULGARIS: Status: ACTIVE | Noted: 2018-07-18

## 2018-07-18 PROBLEM — J30.1 ALLERGIC RHINITIS DUE TO POLLEN: Status: ACTIVE | Noted: 2018-07-18

## 2018-07-18 PROBLEM — E78.5 HYPERLIPIDEMIA, UNSPECIFIED: Status: ACTIVE | Noted: 2018-07-18

## 2018-07-18 PROCEDURE — 17000 DESTRUCT PREMALG LESION: CPT

## 2018-07-18 PROCEDURE — ? LIQUID NITROGEN

## 2018-07-18 PROCEDURE — ? PRESCRIPTION

## 2018-07-18 PROCEDURE — 99213 OFFICE O/P EST LOW 20 MIN: CPT | Mod: 25

## 2018-07-18 PROCEDURE — ? OTHER

## 2018-07-18 PROCEDURE — ? COUNSELING

## 2018-07-18 RX ORDER — CLOBETASOL PROPIONATE 0.46 MG/ML
SOLUTION TOPICAL
Qty: 1 | Refills: 3 | Status: ERX | COMMUNITY
Start: 2018-07-18

## 2018-07-18 RX ORDER — KETOCONAZOLE 2 %
SHAMPOO TOPICAL
Qty: 1 | Refills: 6 | Status: ERX

## 2018-07-18 RX ADMIN — CLOBETASOL PROPIONATE: 0.46 SOLUTION TOPICAL at 11:43

## 2018-07-18 ASSESSMENT — LOCATION DETAILED DESCRIPTION DERM
LOCATION DETAILED: LEFT DORSAL FOOT
LOCATION DETAILED: RIGHT ANTERIOR DISTAL THIGH
LOCATION DETAILED: LEFT PROXIMAL DORSAL FOREARM
LOCATION DETAILED: MID TRAPEZIAL NECK
LOCATION DETAILED: RIGHT VENTRAL LATERAL PROXIMAL FOREARM
LOCATION DETAILED: UPPER STERNUM
LOCATION DETAILED: RIGHT PROXIMAL DORSAL FOREARM
LOCATION DETAILED: LEFT PROXIMAL PRETIBIAL REGION
LOCATION DETAILED: RIGHT NASAL SIDEWALL
LOCATION DETAILED: LEFT VENTRAL DISTAL FOREARM
LOCATION DETAILED: LEFT MEDIAL INFERIOR CHEST
LOCATION DETAILED: POSTERIOR MID-PARIETAL SCALP
LOCATION DETAILED: RIGHT MEDIAL UPPER BACK
LOCATION DETAILED: RIGHT MID-UPPER BACK

## 2018-07-18 ASSESSMENT — LOCATION SIMPLE DESCRIPTION DERM
LOCATION SIMPLE: RIGHT FOREARM
LOCATION SIMPLE: TRAPEZIAL NECK
LOCATION SIMPLE: POSTERIOR SCALP
LOCATION SIMPLE: RIGHT NOSE
LOCATION SIMPLE: LEFT FOOT
LOCATION SIMPLE: RIGHT THIGH
LOCATION SIMPLE: RIGHT UPPER BACK
LOCATION SIMPLE: CHEST
LOCATION SIMPLE: LEFT FOREARM
LOCATION SIMPLE: LEFT PRETIBIAL REGION

## 2018-07-18 ASSESSMENT — LOCATION ZONE DERM
LOCATION ZONE: FEET
LOCATION ZONE: LEG
LOCATION ZONE: ARM
LOCATION ZONE: NOSE
LOCATION ZONE: NECK
LOCATION ZONE: TRUNK
LOCATION ZONE: SCALP

## 2018-07-18 NOTE — PROCEDURE: LIQUID NITROGEN
Number Of Freeze-Thaw Cycles: 1 freeze-thaw cycle
Duration Of Freeze Thaw-Cycle (Seconds): 3
Post-Care Instructions: I reviewed with the patient in detail post-care instructions. Patient is to wear sunprotection, and avoid picking at any of the treated lesions. Pt may apply Vaseline to crusted or scabbing areas.
Detail Level: Simple
Render Post-Care Instructions In Note?: no
Consent: The patient's consent was obtained including but not limited to risks of crusting, scabbing, blistering, scarring, darker or lighter pigmentary change, recurrence, incomplete removal and infection.

## 2018-07-18 NOTE — PROCEDURE: OTHER
Other (Free Text): Treated arms with efudex for 8 weeks. Discussed seeing Elizabeth for chemical peel.
Detail Level: Simple
Note Text (......Xxx Chief Complaint.): This diagnosis correlates with the

## 2018-07-19 NOTE — PROGRESS NOTES
Speech Pathology  ST orders received and pt is scheduled for an initial evaluation at [address] 7/23.     [address], [facility]  [facility]., CCC-SLP

## 2018-07-23 ENCOUNTER — HOSPITAL ENCOUNTER (OUTPATIENT)
Dept: PHYSICAL THERAPY | Age: 68
Discharge: HOME | End: 2018-07-23
Payer: MEDICARE

## 2018-07-23 PROCEDURE — 92610 EVALUATE SWALLOWING FUNCTION: CPT | Performed by: SPEECH-LANGUAGE PATHOLOGIST

## 2018-07-23 PROCEDURE — G8996 SWALLOW CURRENT STATUS: HCPCS | Performed by: SPEECH-LANGUAGE PATHOLOGIST

## 2018-07-23 PROCEDURE — G8997 SWALLOW GOAL STATUS: HCPCS | Performed by: SPEECH-LANGUAGE PATHOLOGIST

## 2018-07-23 NOTE — THERAPY EVALUATION
Hany Wilkins  : 1950  [place]  Secondary: [address] at [address] [place] [address],[address] [place] [address].  [phone]   [phone]         OUTPATIENT SPEECH LANGUAGE PATHOLOGY: Initial Assessment  ICD-10: Treatment Diagnosis: Oropharyngeal Dysphagia R13.12  REFERRING PHYSICIAN: Kirill Fang DO MD Orders: Evaluate and treat  PAST MEDICAL HISTORY:   Ms. Angel Burton is a 78 y.o. female who  has a past medical history of Asthma; Cervical spondylosis with radiculopathy (2017); Chronic pain; GERD (gastroesophageal reflux disease); Hyperlipidemia; Hypertension; Hypothyroid; Lupus; Rheumatoid arthritis ([facility] [address] Rosacea; and Vitamin D deficiency. She also has no past medical history of Adverse effect of anesthesia; Difficult intubation; Malignant hyperthermia due to anesthesia; Nausea & vomiting; or Pseudocholinesterase deficiency. She also  has a past surgical history that includes hx appendectomy; hx bladder suspension; hx endoscopy (); hx cervical fusion (); hx colonoscopy (); hx salpingo-oophorectomy (Bilateral, ); hx knee arthroscopy (Right); hx orthopaedic (Left); hx other surgical; hx total abdominal hysterectomy (); and hx rotator cuff repair (Right, ). MEDICAL/REFERRING DIAGNOSIS: Dysphonia [R49.0]  Dysphagia, pharyngeal phase [R13.13]  DATE OF ONSET: 5 months ago   PRIOR LEVEL OF FUNCTION: Independent  PRECAUTIONS/ALLERGIES: Augmentin [amoxicillin-pot clavulanate]; Clindamycin; Linezolid; Nitrofurantoin macrocrystalline; and Vioxx [rofecoxib]     ASSESSMENT:  Patient is a 73year old female who was referred for a dysphagia evaluation after MBSS showed oropharyngeal dysphagia. Patient reports history of Dysphagia with mainly liquids and mixed consistencies. She also reports hoarseness as well which prompted her to see ENT.  The ENT diagnosed her with mild bowing of vocal cords due to aging voice and referred her for a MBSS to assess her swallow function. MBSS indicated penetration with thin and nectar thick liquids, deep penetration to the cords with thin via straw. Chin tuck with thin liquids x's1 resulted in trace penetration. Evaluating ST recommended regular diet with thin liquids along with chin tuck. Based on the objective data described below, the patient presents with mild-moderate oropharyngeal dysphagia based off MBSS results. Patient reports occasional coughing episodes due to not utilizing chin tuck. She states that she forgets to do so. OME completed and mild redness along pharyngeal wall with a slight uvula deviation to the right but this could be due to dry oral cavity. She was given trials of thin liquids via cup with a chin tuck, puree, mixed and solids. (+) throat clear observed with solids but no other clinical s/sx of aspiration observed. ST recommends continue with current diet that was prescribed during MBSS. Patient and ST went over laryngeal exercises. See below for specifics. ST recommends therapy at 1x a week. Patient will benefit from skilled intervention to address the below impairments. ?????? ? ? This section established at most recent assessment??????????  PROBLEM LIST (Impairments causing functional limitations):  1. Dysphagia   GOALS: (Goals have been discussed and agreed upon with patient.)  SHORT-TERM FUNCTIONAL GOALS: Time Frame: 3 months  Complete laryngeal strengthening exercises Independently at 100% accuracy. Participate in repeat MBSS at 100%. DISCHARGE GOALS: Time Frame: 12 weeks  1. Patient will tolerate least restrictive diet without signs/symptoms of aspiration at discharge for safe swallow function. REHABILITATION POTENTIAL FOR STATED GOALS: GoodPLAN OF CARE:  Patient will benefit from skilled intervention to address the following impairments.   RECOMMENDATIONS AND PLANNED INTERVENTIONS (Benefits and precautions of therapy have been discussed with the patient.):  · PO:  Regular  · Liquids:  regular thin  · with a chin tuck  MEDICATIONS:  · With liquid  COMPENSATORY STRATEGIES/MODIFICATIONS INCLUDING:  · Small sips and bites  OTHER RECOMMENDATIONS (including follow up treatment recommendations):   · Laryngeal exercises  RECOMMENDED DIET MODIFICATIONS DISCUSSED WITH:  · Patient  TREATMENT PLAN EFFECTIVE DATES: 7/23/2018 TO 10/21/2018 (90 days). FREQUENCY/DURATION: Continue to follow patient 1 time a week for 90 days to address above goals. Regarding Elly Ortiz's therapy, I certify that the treatment plan above will be carried out by a therapist or under their direction. Thank you for this referral,  DUC Prince Ed CCC-SLP                  Referring Physician Signature: Adrianna Covarrubias,     Date      SUBJECTIVE:  Alert  Present Symptoms: Dysphagia   Pain Intensity 1: 0  Current Medications:   Current Outpatient Prescriptions on File Prior to Encounter   Medication Sig Dispense Refill    simvastatin (ZOCOR) 20 mg tablet Take 1 Tab by mouth nightly. 90 Tab 3    levothyroxine (SYNTHROID) 112 mcg tablet Take 1 Tab by mouth Daily (before breakfast). 90 Tab 3    triamterene-hydroCHLOROthiazide (DYAZIDE) 37.5-25 mg per capsule Take 1 Cap by mouth daily. 90 Cap 3    fluticasone-salmeterol (ADVAIR DISKUS) 500-50 mcg/dose diskus inhaler Take 1 Puff by inhalation two (2) times a day. 180 Each 3    escitalopram oxalate (LEXAPRO) 10 mg tablet Take 1 Tab by mouth daily. 30 Tab 1    estradiol (ESTRACE) 0.01 % (0.1 mg/gram) vaginal cream Insert 0.5 g into vagina daily. 42.5 g 3    estradiol (ESTRACE) 1 mg tablet Take 1 Tab by mouth daily. 90 Tab 3    clobetasol (TEMOVATE) 0.05 % ointment Apply  to affected area two (2) times a day. 15 g 0    MONTELUKAST SODIUM (SINGULAIR PO) Take 20 mg by mouth every morning.  albuterol (VENTOLIN HFA) 90 mcg/actuation inhaler Take 1 Puff by inhalation every four (4) hours as needed. Indications: bring on the dos      RITUXIMAB (RITUXAN IV) by IntraVENous route. Indications: twice yearly      conjugated estrogens (PREMARIN) 0.625 mg/gram vaginal cream Insert 0.5 g into vagina daily as needed.  ketoconazole (NIZORAL) 2 % shampoo Apply  to affected area as needed. No current facility-administered medications on file prior to encounter. Date Last Reviewed: 7/23/18  Current Dietary Status:  Regular      History of reflux:  YES    Reflux medication:Omeprazole  Social History/Home Situation:       Work/Activity History: Retired    OBJECTIVE:  Objective Measure: Tool Used: National Outcomes Measurement System: Functional Communication Measures: SWALLOWING  Score:  Initial: 5 Most Recent: X (Date: -- )   Interpretation of Tool: This measure describes the change in functional communication status subsequent to speech-language pathology treatment of patients with dysphagia.  o Level 1:  Individual is not able to swallow anything safely by mouth. All nutrition and hydration is received through non-oral means (e.g., nasogastric tube, PEG). o Level 2: Individual is not able to swallow safely by mouth for nutrition and hydration, but may take some consistency with consistent maximal cues in therapy only. Alternative method of feeding required. o Level 3:  Alternative method of feeding required as individual takes less than 50% of nutrition and hydration by mouth, and/or swallowing is safe with consistent use of moderate cues to use compensatory strategies and/or requires maximum diet restriction. o Level 4:  Swallowing is safe, but usually requires moderate cues to use compensatory strategies, and/or the individual has moderate diet restrictions and/or still requires tube feeding and/or oral supplements. o Level 5:  Swallowing is safe with minimal diet restriction and/or occasionally requires minimal cueing to use compensatory strategies.  The individual may occasionally self-cue. All nutrition and hydration needs are met by mouth at mealtime. o Level 6:  Swallowing is safe, and the individual eats and drinks independently and may rarely require minimal cueing. The individual usually self-cues when difficulty occurs. May need to avoid specific food items (e.g., popcorn and nuts), or require additional time (due to dysphagia). o Level 7: The individuals ability to eat independently is not limited by swallow function. Swallowing would be safe and efficient for all consistencies. Compensatory strategies are effectively used when needed. Score Level 7 Level 6 Level 5 Level 4 Level 3 Level 2 Level 1   Modifier CH CI CJ CK CL CM CN   ? Swallowing:     - CURRENT STATUS: CJ - 20%-39% impaired, limited or restricted    - GOAL STATUS:  CH - 0% impaired, limited or restricted    - D/C STATUS:  ---------------To be determined---------------    Respiratory Status:      room air  CXR Results:N/A  MRI/CT Results:N/A  Oral Motor Structure/Speech:  Oral-Motor Structure/Motor Speech  Labial: No impairment  Dentition: Natural  Oral Hygiene: good   Lingual: No impairment  Velum: No impairment  Mandible: No impairment    Cognitive and Communication Status:  Neurologic State: Alert  Orientation Level: Oriented X4  Cognition: Appropriate decision making; Follows commands; Appropriate for age attention/concentration  Perception: Appears intact  Perseveration: No perseveration noted  Safety/Judgement: Awareness of environment    BEDSIDE SWALLOW EVALUATION  Oral Assessment:  Oral Assessment  Labial: No impairment  Dentition: Natural  Oral Hygiene: good   Lingual: No impairment  Velum: No impairment  Mandible: No impairment  Gag Reflex: Present  P.O. Trials:  Patient Position: upright in bed    The patient was given teaspoon to tablespoon   amounts of the following:   Consistency Presented: Thin liquid;Mixed consistency; Solid;Puree  How Presented: Self-fed/presented;Cup/sip;Spoon    ORAL PHASE:  Bolus Acceptance: No impairment  Bolus Formation/Control: No impairment  Propulsion: Delayed (# of seconds)     Oral Residue: None    PHARYNGEAL PHASE:  Initiation of Swallow: Delayed (# of seconds)  Laryngeal Elevation: Decreased  Aspiration Signs/Symptoms: Delayed cough/throat clear  Vocal Quality: No impairment           Pharyngeal Phase Characteristics: Effortful swallow    OTHER OBSERVATIONS:  Rate/bite size: WNL   Endurance: WNL   Coments:      TREATMENT:    (In addition to Assessment/Re-Assessment sessions the following treatments were rendered)  Dysphagia Activities: Activities/Procedures listed utilized to improve progress in swallow strength, swallow timeliness and swallow function. Required minimal cueing to work toward diet advancement. LARYNGEAL / PHARYNGEAL EXERCISES:           Effortful Swallow: Yes  Reps : 5  Sets : 1  Hard Glottal Attack: Yes  Reps : 10  Sets : 1                       Indy: Yes  Reps : 5  Sets : 1                                              Supraglottic Swallow: Yes  Reps : 5  Sets : 1                       __________________________________________________________________________________________________  Treatment Assessment:  Evaluation completed. Progression/Medical Necessity:   · Patient is expected to demonstrate progress in swallow strength, swallow timeliness, swallow function, diet tolerance and swallow safety to improve swallow safety, work toward diet advancement and decrease aspiration risk. Compliance with Program/Exercises: Will assess as treatment progresses. Reason for Continuation of Services/Other Comments:  · Patient continues to require skilled intervention due to dysphagia. Recommendations/Intent for next treatment session: \"Treatment next visit will focus on goals\". Total Treatment Duration:  Time In: 1100  Time Out: LIOR [address], Leilani Negro. Cynthia Caal

## 2018-08-03 ENCOUNTER — HOSPITAL ENCOUNTER (OUTPATIENT)
Dept: PHYSICAL THERAPY | Age: 68
Discharge: HOME | End: 2018-08-03
Payer: MEDICARE

## 2018-08-03 PROCEDURE — 92526 ORAL FUNCTION THERAPY: CPT | Performed by: SPEECH-LANGUAGE PATHOLOGIST

## 2018-08-03 NOTE — PROGRESS NOTES
Heather Muhammad  : 1950  [place]  Secondary: [address] [place] [address] at [address] [id number] [facility], [address] [place] [address],[address], [address] [facility]  [phone]   [phone]         OUTPATIENT SPEECH LANGUAGE PATHOLOGY: Daily Note 1  ICD-10: Treatment Diagnosis: Oropharyngeal Dysphagia R13.12  REFERRING PHYSICIAN: Essence Rollins DO MD Orders: Evaluate and treat  PAST MEDICAL HISTORY:   Ms. Fuentes Nelson is a 78 y.o. female who  has a past medical history of Asthma; Cervical spondylosis with radiculopathy (2017); Chronic pain; GERD (gastroesophageal reflux disease); Hyperlipidemia; Hypertension; Hypothyroid; Lupus; Rheumatoid arthritis ([facility] [address] Rosacea; and Vitamin D deficiency. She also has no past medical history of Adverse effect of anesthesia; Difficult intubation; Malignant hyperthermia due to anesthesia; Nausea & vomiting; or Pseudocholinesterase deficiency. She also  has a past surgical history that includes hx appendectomy; hx bladder suspension; hx endoscopy (); hx cervical fusion (); hx colonoscopy (); hx salpingo-oophorectomy (Bilateral, ); hx knee arthroscopy (Right); hx orthopaedic (Left); hx other surgical; hx total abdominal hysterectomy (); and hx rotator cuff repair (Right, ). MEDICAL/REFERRING DIAGNOSIS: Dysphonia [R49.0]  Dysphagia, pharyngeal phase [R13.13]  DATE OF ONSET: 5 months ago   PRIOR LEVEL OF FUNCTION: Independent  PRECAUTIONS/ALLERGIES: Augmentin [amoxicillin-pot clavulanate]; Clindamycin; Linezolid; Nitrofurantoin macrocrystalline; and Vioxx [rofecoxib]     ASSESSMENT:  Patient present this date for Dysphagia therapy. Patient stated that she's been doing her exercises. See below for specific tasks. Patient will benefit from skilled intervention to address the below impairments. ?????? ? ? This section established at most recent assessment??????????  PROBLEM LIST (Impairments causing functional limitations):  1. Dysphagia   GOALS: (Goals have been discussed and agreed upon with patient.)  SHORT-TERM FUNCTIONAL GOALS: Time Frame: 3 months  Complete laryngeal strengthening exercises Independently at 100% accuracy. Participate in repeat MBSS at 100%. DISCHARGE GOALS: Time Frame: 12 weeks  1. Patient will tolerate least restrictive diet without signs/symptoms of aspiration at discharge for safe swallow function. REHABILITATION POTENTIAL FOR STATED GOALS: GoodPLAN OF CARE:  Patient will benefit from skilled intervention to address the following impairments. RECOMMENDATIONS AND PLANNED INTERVENTIONS (Benefits and precautions of therapy have been discussed with the patient.):  · PO:  Regular  · Liquids:  regular thin  · with a chin tuck  MEDICATIONS:  · With liquid  COMPENSATORY STRATEGIES/MODIFICATIONS INCLUDING:  · Small sips and bites  OTHER RECOMMENDATIONS (including follow up treatment recommendations):   · Laryngeal exercises  RECOMMENDED DIET MODIFICATIONS DISCUSSED WITH:  · Patient  TREATMENT PLAN EFFECTIVE DATES: 7/23/2018 TO 10/21/2018 (90 days). FREQUENCY/DURATION: Continue to follow patient 1 time a week for 90 days to address above goals. Regarding Rachid Ortiz's therapy, I certify that the treatment plan above will be carried out by a therapist or under their direction. Thank you for this referral,  DUC North Ed CCC-SLP                  Referring Physician Signature: Tyrese Duvall DO    Date      SUBJECTIVE:  Alert  Present Symptoms: Dysphagia   Pain Intensity 1: 0  Current Medications:   Current Outpatient Prescriptions on File Prior to Encounter   Medication Sig Dispense Refill    simvastatin (ZOCOR) 20 mg tablet Take 1 Tab by mouth nightly. 90 Tab 3    levothyroxine (SYNTHROID) 112 mcg tablet Take 1 Tab by mouth Daily (before breakfast). 90 Tab 3    triamterene-hydroCHLOROthiazide (DYAZIDE) 37.5-25 mg per capsule Take 1 Cap by mouth daily.  90 Cap 3    fluticasone-salmeterol (ADVAIR DISKUS) 500-50 mcg/dose diskus inhaler Take 1 Puff by inhalation two (2) times a day. 180 Each 3    escitalopram oxalate (LEXAPRO) 10 mg tablet Take 1 Tab by mouth daily. 30 Tab 1    estradiol (ESTRACE) 0.01 % (0.1 mg/gram) vaginal cream Insert 0.5 g into vagina daily. 42.5 g 3    estradiol (ESTRACE) 1 mg tablet Take 1 Tab by mouth daily. 90 Tab 3    clobetasol (TEMOVATE) 0.05 % ointment Apply  to affected area two (2) times a day. 15 g 0    MONTELUKAST SODIUM (SINGULAIR PO) Take 20 mg by mouth every morning.  albuterol (VENTOLIN HFA) 90 mcg/actuation inhaler Take 1 Puff by inhalation every four (4) hours as needed. Indications: bring on the dos      RITUXIMAB (RITUXAN IV) by IntraVENous route. Indications: twice yearly      conjugated estrogens (PREMARIN) 0.625 mg/gram vaginal cream Insert 0.5 g into vagina daily as needed.  ketoconazole (NIZORAL) 2 % shampoo Apply  to affected area as needed. No current facility-administered medications on file prior to encounter. Date Last Reviewed: 8/3/18  Current Dietary Status:  Regular      History of reflux:  YES    Reflux medication:Omeprazole  Social History/Home Situation:       Work/Activity History: Retired    OBJECTIVE:  Objective Measure: Tool Used: National Outcomes Measurement System: Functional Communication Measures: SWALLOWING  Score:  Initial: 5 Most Recent: X (Date: -- )   Interpretation of Tool: This measure describes the change in functional communication status subsequent to speech-language pathology treatment of patients with dysphagia.  o Level 1:  Individual is not able to swallow anything safely by mouth. All nutrition and hydration is received through non-oral means (e.g., nasogastric tube, PEG). o Level 2: Individual is not able to swallow safely by mouth for nutrition and hydration, but may take some consistency with consistent maximal cues in therapy only.  Alternative method of feeding required. o Level 3:  Alternative method of feeding required as individual takes less than 50% of nutrition and hydration by mouth, and/or swallowing is safe with consistent use of moderate cues to use compensatory strategies and/or requires maximum diet restriction. o Level 4:  Swallowing is safe, but usually requires moderate cues to use compensatory strategies, and/or the individual has moderate diet restrictions and/or still requires tube feeding and/or oral supplements. o Level 5:  Swallowing is safe with minimal diet restriction and/or occasionally requires minimal cueing to use compensatory strategies. The individual may occasionally self-cue. All nutrition and hydration needs are met by mouth at mealtime. o Level 6:  Swallowing is safe, and the individual eats and drinks independently and may rarely require minimal cueing. The individual usually self-cues when difficulty occurs. May need to avoid specific food items (e.g., popcorn and nuts), or require additional time (due to dysphagia). o Level 7: The individuals ability to eat independently is not limited by swallow function. Swallowing would be safe and efficient for all consistencies. Compensatory strategies are effectively used when needed. Score Level 7 Level 6 Level 5 Level 4 Level 3 Level 2 Level 1   Modifier CH CI CJ CK CL CM CN   ? Swallowing:     - CURRENT STATUS: CJ - 20%-39% impaired, limited or restricted    - GOAL STATUS:  CH - 0% impaired, limited or restricted    - D/C STATUS:  ---------------To be determined---------------    Respiratory Status:      room air  CXR Results:N/A  MRI/CT Results:N/A  Oral Motor Structure/Speech:       Cognitive and Communication Status:                      BEDSIDE SWALLOW EVALUATION  Oral Assessment:     P.O. Trials:        The patient was given teaspoon to tablespoon   amounts of the following:           ORAL PHASE:                   PHARYNGEAL PHASE: OTHER OBSERVATIONS:  Rate/bite size: WNL   Endurance: WNL   Coments:      TREATMENT:    (In addition to Assessment/Re-Assessment sessions the following treatments were rendered)  Dysphagia Activities: Activities/Procedures listed utilized to improve progress in swallow strength, swallow timeliness and swallow function. Required minimal cueing to work toward diet advancement. LARYNGEAL / PHARYNGEAL EXERCISES:           Effortful Swallow: Yes  Reps : 5  Sets : 2  Hard Glottal Attack: Yes  Reps : 10  Sets : 2                       Indy: Yes  Reps : 5  Sets : 2  Mendelsohn Maneuver: Yes  Reps : 5  Sets : 2                                     Supraglottic Swallow: Yes  Reps : 5  Sets : 2                       __________________________________________________________________________________________________  Treatment Assessment:  Evaluation completed. Progression/Medical Necessity:   · Patient is expected to demonstrate progress in swallow strength, swallow timeliness, swallow function, diet tolerance and swallow safety to improve swallow safety, work toward diet advancement and decrease aspiration risk. Compliance with Program/Exercises: Will assess as treatment progresses. Reason for Continuation of Services/Other Comments:  · Patient continues to require skilled intervention due to dysphagia. Recommendations/Intent for next treatment session: \"Treatment next visit will focus on goals\". Total Treatment Duration:  Time In: 1015  Time Out: [address] Zhou. Korey Ray

## 2018-08-08 ENCOUNTER — HOSPITAL ENCOUNTER (OUTPATIENT)
Dept: MAMMOGRAPHY | Age: 68
Discharge: HOME | End: 2018-08-08
Attending: FAMILY MEDICINE
Payer: MEDICARE

## 2018-08-08 ENCOUNTER — HOSPITAL ENCOUNTER (OUTPATIENT)
Dept: PHYSICAL THERAPY | Age: 68
Discharge: HOME | End: 2018-08-08
Payer: MEDICARE

## 2018-08-08 DIAGNOSIS — Z12.31 ENCOUNTER FOR SCREENING MAMMOGRAM FOR MALIGNANT NEOPLASM OF BREAST: ICD-10-CM

## 2018-08-08 PROCEDURE — G8998 SWALLOW D/C STATUS: HCPCS | Performed by: SPEECH-LANGUAGE PATHOLOGIST

## 2018-08-08 PROCEDURE — 77067 SCR MAMMO BI INCL CAD: CPT

## 2018-08-08 PROCEDURE — G8997 SWALLOW GOAL STATUS: HCPCS | Performed by: SPEECH-LANGUAGE PATHOLOGIST

## 2018-08-08 PROCEDURE — 92526 ORAL FUNCTION THERAPY: CPT | Performed by: SPEECH-LANGUAGE PATHOLOGIST

## 2018-08-08 PROCEDURE — G8996 SWALLOW CURRENT STATUS: HCPCS | Performed by: SPEECH-LANGUAGE PATHOLOGIST

## 2018-08-08 NOTE — PROGRESS NOTES
Heather Muhammad  : 1950  [place]  Secondary: [address] [facility]  [address] [facility], [address] [place] [address],[address], [address] [facility]  [phone]   [phone]         OUTPATIENT SPEECH LANGUAGE PATHOLOGY: Daily Note and Discharge 2  ICD-10: Treatment Diagnosis: Oropharyngeal Dysphagia R13.12  REFERRING PHYSICIAN: Essence Rollins DO MD Orders: Evaluate and treat  PAST MEDICAL HISTORY:   Ms. Fuentes Nelson is a 78 y.o. female who  has a past medical history of Asthma; Cervical spondylosis with radiculopathy (2017); Chronic pain; GERD (gastroesophageal reflux disease); Hyperlipidemia; Hypertension; Hypothyroid; Lupus; Rheumatoid arthritis ([facility] [address] Rosacea; and Vitamin D deficiency. She also has no past medical history of Adverse effect of anesthesia; Difficult intubation; Malignant hyperthermia due to anesthesia; Nausea & vomiting; or Pseudocholinesterase deficiency. She also  has a past surgical history that includes hx appendectomy; hx bladder suspension; hx endoscopy (); hx cervical fusion (); hx colonoscopy (); hx salpingo-oophorectomy (Bilateral, ); hx knee arthroscopy (Right); hx orthopaedic (Left); hx other surgical; hx total abdominal hysterectomy (); hx rotator cuff repair (Right, 2017); and hx breast biopsy (Left). MEDICAL/REFERRING DIAGNOSIS: Dysphonia [R49.0]  Dysphagia, pharyngeal phase [R13.13]  DATE OF ONSET: 5 months ago   PRIOR LEVEL OF FUNCTION: Independent  PRECAUTIONS/ALLERGIES: Augmentin [amoxicillin-pot clavulanate]; Clindamycin; Linezolid; Nitrofurantoin macrocrystalline; and Vioxx [rofecoxib]     ASSESSMENT:  Patient present this date for Dysphagia therapy. Patient stated that she's been doing her exercises. See below for specific tasks. Patient is independent with HEP. No further ST is warranted at this time.      Patient will benefit from skilled intervention to address the below impairments. ?????? ? ? This section established at most recent assessment??????????  PROBLEM LIST (Impairments causing functional limitations):  1. Dysphagia   GOALS: (Goals have been discussed and agreed upon with patient.)  SHORT-TERM FUNCTIONAL GOALS: Time Frame: 3 months  Complete laryngeal strengthening exercises Independently at 100% accuracy. Met 8/8/18  Participate in repeat MBSS at 100%. Discontinued 8/8/18  DISCHARGE GOALS: Time Frame: 12 weeks  1. Patient will tolerate least restrictive diet without signs/symptoms of aspiration at discharge for safe swallow function. Met 8/8/18  REHABILITATION POTENTIAL FOR STATED GOALS: Good   SUBJECTIVE:  Alert  Present Symptoms: Dysphagia   Pain Intensity 1: 0  Current Medications:   Current Outpatient Prescriptions on File Prior to Encounter   Medication Sig Dispense Refill    simvastatin (ZOCOR) 20 mg tablet Take 1 Tab by mouth nightly. 90 Tab 3    levothyroxine (SYNTHROID) 112 mcg tablet Take 1 Tab by mouth Daily (before breakfast). 90 Tab 3    triamterene-hydroCHLOROthiazide (DYAZIDE) 37.5-25 mg per capsule Take 1 Cap by mouth daily. 90 Cap 3    fluticasone-salmeterol (ADVAIR DISKUS) 500-50 mcg/dose diskus inhaler Take 1 Puff by inhalation two (2) times a day. 180 Each 3    escitalopram oxalate (LEXAPRO) 10 mg tablet Take 1 Tab by mouth daily. 30 Tab 1    estradiol (ESTRACE) 0.01 % (0.1 mg/gram) vaginal cream Insert 0.5 g into vagina daily. 42.5 g 3    estradiol (ESTRACE) 1 mg tablet Take 1 Tab by mouth daily. 90 Tab 3    clobetasol (TEMOVATE) 0.05 % ointment Apply  to affected area two (2) times a day. 15 g 0    MONTELUKAST SODIUM (SINGULAIR PO) Take 20 mg by mouth every morning.  albuterol (VENTOLIN HFA) 90 mcg/actuation inhaler Take 1 Puff by inhalation every four (4) hours as needed. Indications: bring on the dos      RITUXIMAB (RITUXAN IV) by IntraVENous route.  Indications: twice yearly      conjugated estrogens (PREMARIN) 0.625 mg/gram vaginal cream Insert 0.5 g into vagina daily as needed.  ketoconazole (NIZORAL) 2 % shampoo Apply  to affected area as needed. No current facility-administered medications on file prior to encounter. Date Last Reviewed: 8/8/18  Current Dietary Status:  Regular      History of reflux:  YES    Reflux medication:Omeprazole  Social History/Home Situation:       Work/Activity History: Retired    OBJECTIVE:  Objective Measure: Tool Used: National Outcomes Measurement System: Functional Communication Measures: SWALLOWING  Score:  Initial: 5 Most Recent: X (Date: -- )   Interpretation of Tool: This measure describes the change in functional communication status subsequent to speech-language pathology treatment of patients with dysphagia.  o Level 1:  Individual is not able to swallow anything safely by mouth. All nutrition and hydration is received through non-oral means (e.g., nasogastric tube, PEG). o Level 2: Individual is not able to swallow safely by mouth for nutrition and hydration, but may take some consistency with consistent maximal cues in therapy only. Alternative method of feeding required. o Level 3:  Alternative method of feeding required as individual takes less than 50% of nutrition and hydration by mouth, and/or swallowing is safe with consistent use of moderate cues to use compensatory strategies and/or requires maximum diet restriction. o Level 4:  Swallowing is safe, but usually requires moderate cues to use compensatory strategies, and/or the individual has moderate diet restrictions and/or still requires tube feeding and/or oral supplements. o Level 5:  Swallowing is safe with minimal diet restriction and/or occasionally requires minimal cueing to use compensatory strategies. The individual may occasionally self-cue. All nutrition and hydration needs are met by mouth at mealtime.   o Level 6:  Swallowing is safe, and the individual eats and drinks independently and may rarely require minimal cueing. The individual usually self-cues when difficulty occurs. May need to avoid specific food items (e.g., popcorn and nuts), or require additional time (due to dysphagia). o Level 7: The individuals ability to eat independently is not limited by swallow function. Swallowing would be safe and efficient for all consistencies. Compensatory strategies are effectively used when needed. Score Level 7 Level 6 Level 5 Level 4 Level 3 Level 2 Level 1   Modifier CH CI CJ CK CL CM CN   ? Swallowing:     - CURRENT STATUS: CJ - 20%-39% impaired, limited or restricted    - GOAL STATUS:  CH - 0% impaired, limited or restricted    - D/C STATUS:  CJ - 20%-39% impaired, limited or restricted    Respiratory Status:      room air  CXR Results:N/A  MRI/CT Results:N/A  Oral Motor Structure/Speech:       Cognitive and Communication Status:                      BEDSIDE SWALLOW EVALUATION  Oral Assessment:     P.O. Trials: The patient was given teaspoon to tablespoon   amounts of the following:           ORAL PHASE:                   PHARYNGEAL PHASE:                            OTHER OBSERVATIONS:  Rate/bite size: WNL   Endurance: WNL   Coments:      TREATMENT:    (In addition to Assessment/Re-Assessment sessions the following treatments were rendered)  Dysphagia Activities: Activities/Procedures listed utilized to improve progress in swallow strength, swallow timeliness and swallow function. Required minimal cueing to work toward diet advancement. LARYNGEAL / PHARYNGEAL EXERCISES:           Effortful Swallow: Yes  Reps : 5  Sets : 2  Hard Glottal Attack: Yes  Reps : 10  Sets : 2                       Indy:  Yes  Reps : 5  Sets : 2  Mendelsohn Maneuver: Yes  Reps : 5  Sets : 2                                     Supraglottic Swallow: Yes  Reps : 5  Sets : 2 __________________________________________________________________________________________________  Discharged this date. Total Treatment Duration:  Time In: 0900  Time Out: [address] aCstro, Gabriela Lozoya. Vika Colbert

## 2018-08-17 ENCOUNTER — APPOINTMENT (OUTPATIENT)
Dept: PHYSICAL THERAPY | Age: 68
End: 2018-08-17
Payer: MEDICARE

## 2018-08-24 ENCOUNTER — APPOINTMENT (OUTPATIENT)
Dept: PHYSICAL THERAPY | Age: 68
End: 2018-08-24
Payer: MEDICARE

## 2018-08-31 ENCOUNTER — APPOINTMENT (OUTPATIENT)
Dept: PHYSICAL THERAPY | Age: 68
End: 2018-08-31
Payer: MEDICARE

## 2018-10-19 ENCOUNTER — APPOINTMENT (RX ONLY)
Dept: URBAN - METROPOLITAN AREA CLINIC 23 | Facility: CLINIC | Age: 68
Setting detail: DERMATOLOGY
End: 2018-10-19

## 2018-10-19 DIAGNOSIS — L57.0 ACTINIC KERATOSIS: ICD-10-CM

## 2018-10-19 PROBLEM — L70.0 ACNE VULGARIS: Status: ACTIVE | Noted: 2018-10-19

## 2018-10-19 PROCEDURE — ? COUNSELING

## 2018-10-19 PROCEDURE — ? CHEMICAL PEEL (PREMALIGNANT DESTRUCTION)

## 2018-10-19 PROCEDURE — 17004 DESTROY PREMAL LESIONS 15/>: CPT

## 2018-10-19 ASSESSMENT — LOCATION DETAILED DESCRIPTION DERM
LOCATION DETAILED: RIGHT ANTERIOR DISTAL THIGH
LOCATION DETAILED: LEFT PROXIMAL PRETIBIAL REGION
LOCATION DETAILED: NASAL SUPRATIP
LOCATION DETAILED: LEFT NASAL SIDEWALL
LOCATION DETAILED: RIGHT PROXIMAL PRETIBIAL REGION
LOCATION DETAILED: RIGHT INFERIOR NASAL CHEEK
LOCATION DETAILED: LEFT ANTERIOR DISTAL THIGH

## 2018-10-19 ASSESSMENT — LOCATION SIMPLE DESCRIPTION DERM
LOCATION SIMPLE: RIGHT THIGH
LOCATION SIMPLE: LEFT PRETIBIAL REGION
LOCATION SIMPLE: RIGHT PRETIBIAL REGION
LOCATION SIMPLE: NOSE
LOCATION SIMPLE: LEFT NOSE
LOCATION SIMPLE: LEFT THIGH
LOCATION SIMPLE: RIGHT CHEEK

## 2018-10-19 ASSESSMENT — LOCATION ZONE DERM
LOCATION ZONE: LEG
LOCATION ZONE: FACE
LOCATION ZONE: NOSE

## 2018-10-19 NOTE — PROCEDURE: CHEMICAL PEEL (PREMALIGNANT DESTRUCTION)
Total Number Of Aks Treated: 20
Post-Care Instructions: I reviewed with the patient in detail post-care instructions. Patient should avoid sun exposure and wear sun protection.
Consent: Prior to the procedure, written consent was obtained and risks were reviewed, including but not limited to: redness, peeling, blistering, pigmentary change, scarring, infection, and pain.
Detail Level: Zone
Time (Mins): 5
Chemical Peel: TCA 25%
Endpoint: light frosting
Treatment Number: 1

## 2018-12-28 ENCOUNTER — RX ONLY (OUTPATIENT)
Age: 68
Setting detail: RX ONLY
End: 2018-12-28

## 2018-12-28 RX ORDER — DOXYCYCLINE HYCLATE 100 MG/1
CAPSULE, GELATIN COATED ORAL
Qty: 30 | Refills: 0 | Status: ERX | COMMUNITY
Start: 2018-12-28

## 2018-12-28 RX ORDER — CLINDAMYCIN PHOSPHATE 10 MG/ML
LOTION TOPICAL
Qty: 1 | Refills: 3 | Status: ACTIVE

## 2018-12-28 RX ORDER — DOXYCYCLINE HYCLATE 100 MG/1
CAPSULE, GELATIN COATED ORAL
Qty: 30 | Refills: 0 | Status: CANCELLED
Stop reason: SDUPTHER

## 2019-01-16 ENCOUNTER — APPOINTMENT (RX ONLY)
Dept: URBAN - METROPOLITAN AREA CLINIC 24 | Facility: CLINIC | Age: 69
Setting detail: DERMATOLOGY
End: 2019-01-16

## 2019-01-16 DIAGNOSIS — L81.4 OTHER MELANIN HYPERPIGMENTATION: ICD-10-CM

## 2019-01-16 DIAGNOSIS — L57.0 ACTINIC KERATOSIS: ICD-10-CM

## 2019-01-16 DIAGNOSIS — D485 NEOPLASM OF UNCERTAIN BEHAVIOR OF SKIN: ICD-10-CM

## 2019-01-16 DIAGNOSIS — L56.5 DISSEMINATED SUPERFICIAL ACTINIC POROKERATOSIS (DSAP): ICD-10-CM

## 2019-01-16 PROBLEM — L29.8 OTHER PRURITUS: Status: ACTIVE | Noted: 2019-01-16

## 2019-01-16 PROBLEM — L85.3 XEROSIS CUTIS: Status: ACTIVE | Noted: 2019-01-16

## 2019-01-16 PROBLEM — E78.5 HYPERLIPIDEMIA, UNSPECIFIED: Status: ACTIVE | Noted: 2019-01-16

## 2019-01-16 PROBLEM — D48.5 NEOPLASM OF UNCERTAIN BEHAVIOR OF SKIN: Status: ACTIVE | Noted: 2019-01-16

## 2019-01-16 PROBLEM — J45.909 UNSPECIFIED ASTHMA, UNCOMPLICATED: Status: ACTIVE | Noted: 2019-01-16

## 2019-01-16 PROCEDURE — 17000 DESTRUCT PREMALG LESION: CPT | Mod: 59

## 2019-01-16 PROCEDURE — 17003 DESTRUCT PREMALG LES 2-14: CPT

## 2019-01-16 PROCEDURE — ? BIOPSY BY SHAVE METHOD

## 2019-01-16 PROCEDURE — 11102 TANGNTL BX SKIN SINGLE LES: CPT

## 2019-01-16 PROCEDURE — 99213 OFFICE O/P EST LOW 20 MIN: CPT | Mod: 25

## 2019-01-16 PROCEDURE — ? LIQUID NITROGEN

## 2019-01-16 PROCEDURE — ? OTHER

## 2019-01-16 PROCEDURE — ? COUNSELING

## 2019-01-16 ASSESSMENT — LOCATION DETAILED DESCRIPTION DERM
LOCATION DETAILED: LEFT LATERAL EYEBROW
LOCATION DETAILED: RIGHT PROXIMAL RADIAL DORSAL FOREARM
LOCATION DETAILED: RIGHT DISTAL DORSAL FOREARM
LOCATION DETAILED: LEFT DISTAL DORSAL FOREARM
LOCATION DETAILED: RIGHT PROXIMAL PRETIBIAL REGION
LOCATION DETAILED: LEFT PROXIMAL DORSAL FOREARM
LOCATION DETAILED: RIGHT ULNAR DORSAL HAND
LOCATION DETAILED: LEFT PROXIMAL RADIAL DORSAL FOREARM
LOCATION DETAILED: LEFT ANTERIOR SHOULDER

## 2019-01-16 ASSESSMENT — LOCATION ZONE DERM
LOCATION ZONE: LEG
LOCATION ZONE: FACE
LOCATION ZONE: HAND
LOCATION ZONE: ARM
LOCATION ZONE: ARM

## 2019-01-16 ASSESSMENT — LOCATION SIMPLE DESCRIPTION DERM
LOCATION SIMPLE: RIGHT PRETIBIAL REGION
LOCATION SIMPLE: LEFT SHOULDER
LOCATION SIMPLE: LEFT FOREARM
LOCATION SIMPLE: RIGHT HAND
LOCATION SIMPLE: LEFT EYEBROW
LOCATION SIMPLE: RIGHT FOREARM

## 2019-01-16 NOTE — PROCEDURE: LIQUID NITROGEN
Detail Level: Simple
Post-Care Instructions: I reviewed with the patient in detail post-care instructions. Patient is to wear sunprotection, and avoid picking at any of the treated lesions. Pt may apply Vaseline to crusted or scabbing areas.
Number Of Freeze-Thaw Cycles: 1 freeze-thaw cycle
Duration Of Freeze Thaw-Cycle (Seconds): 5
Consent: The patient's consent was obtained including but not limited to risks of crusting, scabbing, blistering, scarring, darker or lighter pigmentary change, recurrence, incomplete removal and infection.
Render Post-Care Instructions In Note?: no

## 2019-01-16 NOTE — PROCEDURE: MIPS QUALITY
Quality 111:Pneumonia Vaccination Status For Older Adults: Pneumococcal Vaccination Previously Received
Detail Level: Detailed
Quality 110: Preventive Care And Screening: Influenza Immunization: Influenza immunization was not ordered or administered, reason not given
Quality 130: Documentation Of Current Medications In The Medical Record: Current Medications Documented

## 2019-01-16 NOTE — PROCEDURE: BIOPSY BY SHAVE METHOD
Size Of Lesion In Cm: 0
Type Of Destruction Used: Curettage
Biopsy Type: H and E
Curettage Text: The wound bed was treated with curettage after the biopsy was performed.
Depth Of Biopsy: dermis
Electrodesiccation And Curettage Text: The wound bed was treated with electrodesiccation and curettage after the biopsy was performed.
Bill For Surgical Tray: no
Billing Type: Third-Party Bill
Silver Nitrate Text: The wound bed was treated with silver nitrate after the biopsy was performed.
Electrodesiccation Text: The wound bed was treated with electrodesiccation after the biopsy was performed.
Cryotherapy Text: The wound bed was treated with cryotherapy after the biopsy was performed.
Wound Care: Petrolatum
Hemostasis: Aluminum Chloride
Detail Level: Detailed
Anesthesia Volume In Cc: 0.5
Was A Bandage Applied: Yes
Dressing: bandage
Anesthesia Type: 1% lidocaine with 1:100,000 epinephrine and a 1:6 solution of 8.4% sodium bicarbonate
Accession #: [id number]
Biopsy Method: Dermablade

## 2019-01-16 NOTE — PROCEDURE: OTHER
Detail Level: Simple
Other (Free Text): Somewhat improved with chemical peel with minal
Note Text (......Xxx Chief Complaint.): This diagnosis correlates with the

## 2019-07-31 ENCOUNTER — APPOINTMENT (RX ONLY)
Dept: URBAN - METROPOLITAN AREA CLINIC 24 | Facility: CLINIC | Age: 69
Setting detail: DERMATOLOGY
End: 2019-07-31

## 2019-07-31 ENCOUNTER — RX ONLY (OUTPATIENT)
Age: 69
Setting detail: RX ONLY
End: 2019-07-31

## 2019-07-31 DIAGNOSIS — L56.5 DISSEMINATED SUPERFICIAL ACTINIC POROKERATOSIS (DSAP): ICD-10-CM

## 2019-07-31 DIAGNOSIS — L20.89 OTHER ATOPIC DERMATITIS: ICD-10-CM

## 2019-07-31 DIAGNOSIS — L82.1 OTHER SEBORRHEIC KERATOSIS: ICD-10-CM

## 2019-07-31 DIAGNOSIS — D485 NEOPLASM OF UNCERTAIN BEHAVIOR OF SKIN: ICD-10-CM

## 2019-07-31 DIAGNOSIS — L57.0 ACTINIC KERATOSIS: ICD-10-CM

## 2019-07-31 DIAGNOSIS — L81.4 OTHER MELANIN HYPERPIGMENTATION: ICD-10-CM

## 2019-07-31 PROBLEM — L20.84 INTRINSIC (ALLERGIC) ECZEMA: Status: ACTIVE | Noted: 2019-07-31

## 2019-07-31 PROBLEM — D48.5 NEOPLASM OF UNCERTAIN BEHAVIOR OF SKIN: Status: ACTIVE | Noted: 2019-07-31

## 2019-07-31 PROBLEM — H91.90 UNSPECIFIED HEARING LOSS, UNSPECIFIED EAR: Status: ACTIVE | Noted: 2019-07-31

## 2019-07-31 PROBLEM — J30.1 ALLERGIC RHINITIS DUE TO POLLEN: Status: ACTIVE | Noted: 2019-07-31

## 2019-07-31 PROCEDURE — 17000 DESTRUCT PREMALG LESION: CPT | Mod: 59

## 2019-07-31 PROCEDURE — 11102 TANGNTL BX SKIN SINGLE LES: CPT

## 2019-07-31 PROCEDURE — ? COUNSELING

## 2019-07-31 PROCEDURE — 17003 DESTRUCT PREMALG LES 2-14: CPT

## 2019-07-31 PROCEDURE — 99214 OFFICE O/P EST MOD 30 MIN: CPT | Mod: 25

## 2019-07-31 PROCEDURE — ? BIOPSY BY SHAVE METHOD

## 2019-07-31 PROCEDURE — ? LIQUID NITROGEN

## 2019-07-31 PROCEDURE — ? OTHER

## 2019-07-31 RX ORDER — CLOBETASOL PROPIONATE 0.46 MG/ML
SOLUTION TOPICAL
Qty: 1 | Refills: 4 | Status: ERX

## 2019-07-31 ASSESSMENT — LOCATION SIMPLE DESCRIPTION DERM
LOCATION SIMPLE: RIGHT WRIST
LOCATION SIMPLE: RIGHT KNEE
LOCATION SIMPLE: RIGHT SHOULDER
LOCATION SIMPLE: LEFT SHOULDER
LOCATION SIMPLE: LEFT FOREARM
LOCATION SIMPLE: LEFT PRETIBIAL REGION
LOCATION SIMPLE: RIGHT UPPER ARM
LOCATION SIMPLE: ABDOMEN
LOCATION SIMPLE: CHEST
LOCATION SIMPLE: RIGHT FOREARM
LOCATION SIMPLE: RIGHT LOWER BACK
LOCATION SIMPLE: LEFT UPPER BACK

## 2019-07-31 ASSESSMENT — LOCATION DETAILED DESCRIPTION DERM
LOCATION DETAILED: UPPER STERNUM
LOCATION DETAILED: RIGHT DISTAL DORSAL FOREARM
LOCATION DETAILED: RIGHT ANTECUBITAL SKIN
LOCATION DETAILED: LEFT DISTAL PRETIBIAL REGION
LOCATION DETAILED: RIGHT LATERAL DORSAL WRIST
LOCATION DETAILED: LEFT SUPERIOR UPPER BACK
LOCATION DETAILED: RIGHT POSTERIOR SHOULDER
LOCATION DETAILED: LEFT MID-UPPER BACK
LOCATION DETAILED: LEFT ANTERIOR SHOULDER
LOCATION DETAILED: RIGHT SUPERIOR LATERAL MIDBACK
LOCATION DETAILED: RIGHT KNEE
LOCATION DETAILED: LEFT DISTAL DORSAL FOREARM
LOCATION DETAILED: EPIGASTRIC SKIN

## 2019-07-31 ASSESSMENT — LOCATION ZONE DERM
LOCATION ZONE: TRUNK
LOCATION ZONE: ARM
LOCATION ZONE: LEG
LOCATION ZONE: ARM

## 2019-07-31 NOTE — PROCEDURE: OTHER
Note Text (......Xxx Chief Complaint.): This diagnosis correlates with the
Other (Free Text): Somewhat improved with chemical peel with minal
Detail Level: Simple

## 2019-07-31 NOTE — PROCEDURE: BIOPSY BY SHAVE METHOD
Detail Level: Detailed
Was A Bandage Applied: Yes
Cryotherapy Text: The wound bed was treated with cryotherapy after the biopsy was performed.
Bill For Surgical Tray: no
Anesthesia Volume In Cc: 0.5
Size Of Lesion In Cm: 0
Billing Type: Third-Party Bill
Electrodesiccation Text: The wound bed was treated with electrodesiccation after the biopsy was performed.
Wound Care: Petrolatum
Biopsy Method: Dermablade
Curettage Text: The wound bed was treated with curettage after the biopsy was performed.
Type Of Destruction Used: Curettage
Depth Of Biopsy: dermis
Silver Nitrate Text: The wound bed was treated with silver nitrate after the biopsy was performed.
Accession #: [id number]
Biopsy Type: H and E
Anesthesia Type: 1% lidocaine with 1:100,000 epinephrine and a 1:6 solution of 8.4% sodium bicarbonate
Dressing: bandage
Electrodesiccation And Curettage Text: The wound bed was treated with electrodesiccation and curettage after the biopsy was performed.
Hemostasis: Aluminum Chloride

## 2019-07-31 NOTE — PROCEDURE: LIQUID NITROGEN
Detail Level: Simple
Number Of Freeze-Thaw Cycles: 3 freeze-thaw cycles
Render Note In Bullet Format When Appropriate: No
Duration Of Freeze Thaw-Cycle (Seconds): 5
Consent: The patient's consent was obtained including but not limited to risks of crusting, scabbing, blistering, scarring, darker or lighter pigmentary change, recurrence, incomplete removal and infection.
Post-Care Instructions: I reviewed with the patient in detail post-care instructions. Patient is to wear sunprotection, and avoid picking at any of the treated lesions. Pt may apply Vaseline to crusted or scabbing areas.

## 2019-08-05 ENCOUNTER — RX ONLY (OUTPATIENT)
Age: 69
Setting detail: RX ONLY
End: 2019-08-05

## 2019-08-05 RX ORDER — FLUOROURACIL 2 G/40G
CREAM TOPICAL
Qty: 40 | Refills: 3 | Status: ERX | COMMUNITY
Start: 2019-08-05

## 2019-08-10 ENCOUNTER — HOSPITAL ENCOUNTER (OUTPATIENT)
Dept: MAMMOGRAPHY | Age: 69
Discharge: HOME | End: 2019-08-10
Attending: NURSE PRACTITIONER
Payer: MEDICARE

## 2019-08-10 DIAGNOSIS — Z12.31 SCREENING MAMMOGRAM, ENCOUNTER FOR: ICD-10-CM

## 2019-08-10 PROCEDURE — 77067 SCR MAMMO BI INCL CAD: CPT

## 2019-12-02 ENCOUNTER — APPOINTMENT (RX ONLY)
Dept: URBAN - METROPOLITAN AREA CLINIC 24 | Facility: CLINIC | Age: 69
Setting detail: DERMATOLOGY
End: 2019-12-02

## 2019-12-02 DIAGNOSIS — L57.0 ACTINIC KERATOSIS: ICD-10-CM

## 2019-12-02 DIAGNOSIS — L71.0 PERIORAL DERMATITIS: ICD-10-CM

## 2019-12-02 DIAGNOSIS — L85.3 XEROSIS CUTIS: ICD-10-CM

## 2019-12-02 DIAGNOSIS — L82.1 OTHER SEBORRHEIC KERATOSIS: ICD-10-CM

## 2019-12-02 DIAGNOSIS — L81.4 OTHER MELANIN HYPERPIGMENTATION: ICD-10-CM

## 2019-12-02 PROBLEM — M12.9 ARTHROPATHY, UNSPECIFIED: Status: ACTIVE | Noted: 2019-12-02

## 2019-12-02 PROBLEM — D04.61 CARCINOMA IN SITU OF SKIN OF RIGHT UPPER LIMB, INCLUDING SHOULDER: Status: ACTIVE | Noted: 2019-12-02

## 2019-12-02 PROBLEM — L30.9 DERMATITIS, UNSPECIFIED: Status: ACTIVE | Noted: 2019-12-02

## 2019-12-02 PROBLEM — J30.1 ALLERGIC RHINITIS DUE TO POLLEN: Status: ACTIVE | Noted: 2019-12-02

## 2019-12-02 PROCEDURE — ? TREATMENT REGIMEN

## 2019-12-02 PROCEDURE — 99213 OFFICE O/P EST LOW 20 MIN: CPT

## 2019-12-02 PROCEDURE — ? OTHER

## 2019-12-02 PROCEDURE — ? COUNSELING

## 2019-12-02 ASSESSMENT — LOCATION SIMPLE DESCRIPTION DERM
LOCATION SIMPLE: LEFT THIGH
LOCATION SIMPLE: LEFT CHEEK
LOCATION SIMPLE: RIGHT CALF
LOCATION SIMPLE: LEFT UPPER BACK
LOCATION SIMPLE: RIGHT LOWER BACK
LOCATION SIMPLE: RIGHT KNEE
LOCATION SIMPLE: RIGHT UPPER BACK
LOCATION SIMPLE: LEFT PRETIBIAL REGION
LOCATION SIMPLE: CHEST
LOCATION SIMPLE: RIGHT PRETIBIAL REGION
LOCATION SIMPLE: LEFT SHOULDER
LOCATION SIMPLE: ABDOMEN

## 2019-12-02 ASSESSMENT — LOCATION DETAILED DESCRIPTION DERM
LOCATION DETAILED: EPIGASTRIC SKIN
LOCATION DETAILED: LEFT PROXIMAL PRETIBIAL REGION
LOCATION DETAILED: RIGHT DISTAL PRETIBIAL REGION
LOCATION DETAILED: LEFT ANTERIOR PROXIMAL THIGH
LOCATION DETAILED: LEFT MID-UPPER BACK
LOCATION DETAILED: LEFT INFERIOR CENTRAL MALAR CHEEK
LOCATION DETAILED: LEFT ANTERIOR SHOULDER
LOCATION DETAILED: UPPER STERNUM
LOCATION DETAILED: LEFT DISTAL PRETIBIAL REGION
LOCATION DETAILED: RIGHT DISTAL CALF
LOCATION DETAILED: RIGHT SUPERIOR LATERAL MIDBACK
LOCATION DETAILED: RIGHT INFERIOR MEDIAL UPPER BACK
LOCATION DETAILED: LEFT SUPERIOR UPPER BACK
LOCATION DETAILED: RIGHT KNEE

## 2019-12-02 ASSESSMENT — LOCATION ZONE DERM
LOCATION ZONE: TRUNK
LOCATION ZONE: FACE
LOCATION ZONE: ARM
LOCATION ZONE: LEG

## 2019-12-02 NOTE — PROCEDURE: TREATMENT REGIMEN
Initiate Treatment: Short shower times, then moisturize
Samples Given: Soolantra to use once to twice a day
Initiate Treatment: Sulfur bar soap
Detail Level: Zone

## 2019-12-02 NOTE — PROCEDURE: OTHER
Other (Free Text): Used Efudex
Other (Free Text): Continue Efudex to the legs
Note Text (......Xxx Chief Complaint.): This diagnosis correlates with the
Detail Level: Simple

## 2019-12-03 ENCOUNTER — HOSPITAL ENCOUNTER (OUTPATIENT)
Dept: GENERAL RADIOLOGY | Age: 69
Discharge: HOME | End: 2019-12-03
Payer: MEDICARE

## 2019-12-03 DIAGNOSIS — M25.551 PAIN OF RIGHT HIP JOINT: ICD-10-CM

## 2019-12-03 PROCEDURE — 73502 X-RAY EXAM HIP UNI 2-3 VIEWS: CPT

## 2020-05-11 ENCOUNTER — APPOINTMENT (RX ONLY)
Dept: URBAN - METROPOLITAN AREA CLINIC 24 | Facility: CLINIC | Age: 70
Setting detail: DERMATOLOGY
End: 2020-05-11

## 2020-05-11 DIAGNOSIS — Z85.828 PERSONAL HISTORY OF OTHER MALIGNANT NEOPLASM OF SKIN: ICD-10-CM

## 2020-05-11 DIAGNOSIS — L82.0 INFLAMED SEBORRHEIC KERATOSIS: ICD-10-CM

## 2020-05-11 DIAGNOSIS — L81.4 OTHER MELANIN HYPERPIGMENTATION: ICD-10-CM

## 2020-05-11 DIAGNOSIS — L21.8 OTHER SEBORRHEIC DERMATITIS: ICD-10-CM

## 2020-05-11 DIAGNOSIS — L56.5 DISSEMINATED SUPERFICIAL ACTINIC POROKERATOSIS (DSAP): ICD-10-CM

## 2020-05-11 PROBLEM — M12.9 ARTHROPATHY, UNSPECIFIED: Status: ACTIVE | Noted: 2020-05-11

## 2020-05-11 PROBLEM — L85.3 XEROSIS CUTIS: Status: ACTIVE | Noted: 2020-05-11

## 2020-05-11 PROBLEM — L57.0 ACTINIC KERATOSIS: Status: ACTIVE | Noted: 2020-05-11

## 2020-05-11 PROBLEM — E78.5 HYPERLIPIDEMIA, UNSPECIFIED: Status: ACTIVE | Noted: 2020-05-11

## 2020-05-11 PROBLEM — J45.909 UNSPECIFIED ASTHMA, UNCOMPLICATED: Status: ACTIVE | Noted: 2020-05-11

## 2020-05-11 PROCEDURE — 17110 DESTRUCTION B9 LES UP TO 14: CPT

## 2020-05-11 PROCEDURE — ? COUNSELING

## 2020-05-11 PROCEDURE — ? PRESCRIPTION

## 2020-05-11 PROCEDURE — ? LIQUID NITROGEN

## 2020-05-11 PROCEDURE — 99213 OFFICE O/P EST LOW 20 MIN: CPT | Mod: 25

## 2020-05-11 RX ORDER — KETOCONAZOLE 2 %
SHAMPOO TOPICAL
Qty: 1 | Refills: 6 | Status: ERX | COMMUNITY
Start: 2020-05-11

## 2020-05-11 RX ADMIN — Medication: at 00:00

## 2020-05-11 ASSESSMENT — LOCATION DETAILED DESCRIPTION DERM
LOCATION DETAILED: LEFT SUPERIOR UPPER BACK
LOCATION DETAILED: LEFT ANTERIOR SHOULDER
LOCATION DETAILED: RIGHT LATERAL DORSAL WRIST
LOCATION DETAILED: UPPER STERNUM
LOCATION DETAILED: RIGHT PROXIMAL POSTERIOR UPPER ARM
LOCATION DETAILED: POSTERIOR MID-PARIETAL SCALP
LOCATION DETAILED: LEFT MID-UPPER BACK

## 2020-05-11 ASSESSMENT — LOCATION SIMPLE DESCRIPTION DERM
LOCATION SIMPLE: RIGHT WRIST
LOCATION SIMPLE: LEFT UPPER BACK
LOCATION SIMPLE: RIGHT POSTERIOR UPPER ARM
LOCATION SIMPLE: CHEST
LOCATION SIMPLE: POSTERIOR SCALP
LOCATION SIMPLE: LEFT SHOULDER

## 2020-05-11 ASSESSMENT — LOCATION ZONE DERM
LOCATION ZONE: ARM
LOCATION ZONE: TRUNK
LOCATION ZONE: ARM
LOCATION ZONE: SCALP

## 2020-05-11 NOTE — PROCEDURE: LIQUID NITROGEN
Medical Necessity Clause: This procedure was medically necessary because the lesions was irritated and itchy.inflamed, bleeding.
Render Post-Care Instructions In Note?: no
Consent: The patient's consent was obtained including but not limited to risks of crusting, scabbing, blistering, scarring, darker or lighter pigmentary change, recurrence, incomplete removal and infection.
Detail Level: Detailed
Post-Care Instructions: I reviewed with the patient in detail post-care instructions. Patient is to wear sunprotection, and avoid picking at any of the treated lesions. Pt may apply Vaseline to crusted or scabbing areas.
Number Of Freeze-Thaw Cycles: 2 freeze-thaw cycles
Medical Necessity Information: It is in your best interest to select a reason for this procedure from the list below. All of these items fulfill various CMS LCD requirements except the new and changing color options.

## 2020-11-09 ENCOUNTER — APPOINTMENT (RX ONLY)
Dept: URBAN - METROPOLITAN AREA CLINIC 24 | Facility: CLINIC | Age: 70
Setting detail: DERMATOLOGY
End: 2020-11-09

## 2020-11-09 DIAGNOSIS — L56.5 DISSEMINATED SUPERFICIAL ACTINIC POROKERATOSIS (DSAP): ICD-10-CM

## 2020-11-09 DIAGNOSIS — L81.4 OTHER MELANIN HYPERPIGMENTATION: ICD-10-CM

## 2020-11-09 DIAGNOSIS — L73.8 OTHER SPECIFIED FOLLICULAR DISORDERS: ICD-10-CM

## 2020-11-09 DIAGNOSIS — Z85.828 PERSONAL HISTORY OF OTHER MALIGNANT NEOPLASM OF SKIN: ICD-10-CM

## 2020-11-09 DIAGNOSIS — L82.1 OTHER SEBORRHEIC KERATOSIS: ICD-10-CM

## 2020-11-09 PROBLEM — H91.90 UNSPECIFIED HEARING LOSS, UNSPECIFIED EAR: Status: ACTIVE | Noted: 2020-11-09

## 2020-11-09 PROBLEM — L57.0 ACTINIC KERATOSIS: Status: ACTIVE | Noted: 2020-11-09

## 2020-11-09 PROBLEM — L29.8 OTHER PRURITUS: Status: ACTIVE | Noted: 2020-11-09

## 2020-11-09 PROBLEM — J30.1 ALLERGIC RHINITIS DUE TO POLLEN: Status: ACTIVE | Noted: 2020-11-09

## 2020-11-09 PROCEDURE — ? COUNSELING

## 2020-11-09 PROCEDURE — ? PRESCRIPTION

## 2020-11-09 PROCEDURE — 17110 DESTRUCTION B9 LES UP TO 14: CPT

## 2020-11-09 PROCEDURE — ? LIQUID NITROGEN

## 2020-11-09 PROCEDURE — 99214 OFFICE O/P EST MOD 30 MIN: CPT | Mod: 25

## 2020-11-09 RX ORDER — FLUOROURACIL 2 G/40G
CREAM TOPICAL BID
Qty: 40 | Refills: 2 | Status: ERX

## 2020-11-09 ASSESSMENT — LOCATION DETAILED DESCRIPTION DERM
LOCATION DETAILED: RIGHT DORSAL FOOT
LOCATION DETAILED: UPPER STERNUM
LOCATION DETAILED: LEFT SUPERIOR UPPER BACK
LOCATION DETAILED: LEFT ANKLE
LOCATION DETAILED: LEFT ANTERIOR SHOULDER
LOCATION DETAILED: LEFT CENTRAL MALAR CHEEK
LOCATION DETAILED: RIGHT PROXIMAL POSTERIOR UPPER ARM
LOCATION DETAILED: LEFT DISTAL POSTERIOR UPPER ARM
LOCATION DETAILED: RIGHT DISTAL POSTERIOR UPPER ARM
LOCATION DETAILED: RIGHT LATERAL DORSAL WRIST

## 2020-11-09 ASSESSMENT — LOCATION SIMPLE DESCRIPTION DERM
LOCATION SIMPLE: CHEST
LOCATION SIMPLE: LEFT ANKLE
LOCATION SIMPLE: LEFT UPPER BACK
LOCATION SIMPLE: RIGHT POSTERIOR UPPER ARM
LOCATION SIMPLE: LEFT CHEEK
LOCATION SIMPLE: RIGHT WRIST
LOCATION SIMPLE: LEFT POSTERIOR UPPER ARM
LOCATION SIMPLE: LEFT SHOULDER
LOCATION SIMPLE: RIGHT FOOT

## 2020-11-09 ASSESSMENT — LOCATION ZONE DERM
LOCATION ZONE: ARM
LOCATION ZONE: ARM
LOCATION ZONE: FEET
LOCATION ZONE: FACE
LOCATION ZONE: LEG
LOCATION ZONE: TRUNK

## 2020-11-09 NOTE — HPI: FULL BODY SKIN EXAMINATION
What Type Of Note Output Would You Prefer (Optional)?: Standard Output
What Is The Reason For Today's Visit?: Full Body Skin Examination with No Concerns
What Is The Reason For Today's Visit? (Being Monitored For X): concerning skin lesions on an annual basis
How Severe Are Your Spot(S)?: mild
What Is The Reason For Today's Visit?: Full Body Skin Examination
How Severe Are Your Spot(S)?: moderate

## 2020-11-09 NOTE — PROCEDURE: LIQUID NITROGEN
Consent: The patient's consent was obtained including but not limited to risks of crusting, scabbing, blistering, scarring, darker or lighter pigmentary change, recurrence, incomplete removal and infection.
Include Z78.9 (Other Specified Conditions Influencing Health Status) As An Associated Diagnosis?: No
Medical Necessity Information: It is in your best interest to select a reason for this procedure from the list below. All of these items fulfill various CMS LCD requirements except the new and changing color options.
Detail Level: Detailed
Post-Care Instructions: I reviewed with the patient in detail post-care instructions. Patient is to wear sunprotection, and avoid picking at any of the treated lesions. Pt may apply Vaseline to crusted or scabbing areas.
Medical Necessity Clause: This procedure was medically necessary because the lesions was irritated and itchy.inflamed, bleeding.
Number Of Freeze-Thaw Cycles: 2 freeze-thaw cycles

## 2021-08-09 ENCOUNTER — APPOINTMENT (RX ONLY)
Dept: URBAN - METROPOLITAN AREA CLINIC 24 | Facility: CLINIC | Age: 71
Setting detail: DERMATOLOGY
End: 2021-08-09

## 2021-08-09 DIAGNOSIS — D485 NEOPLASM OF UNCERTAIN BEHAVIOR OF SKIN: ICD-10-CM

## 2021-08-09 DIAGNOSIS — L56.5 DISSEMINATED SUPERFICIAL ACTINIC POROKERATOSIS (DSAP): ICD-10-CM

## 2021-08-09 DIAGNOSIS — L21.8 OTHER SEBORRHEIC DERMATITIS: ICD-10-CM

## 2021-08-09 DIAGNOSIS — Z85.828 PERSONAL HISTORY OF OTHER MALIGNANT NEOPLASM OF SKIN: ICD-10-CM

## 2021-08-09 DIAGNOSIS — L57.0 ACTINIC KERATOSIS: ICD-10-CM

## 2021-08-09 PROBLEM — E78.5 HYPERLIPIDEMIA, UNSPECIFIED: Status: ACTIVE | Noted: 2021-08-09

## 2021-08-09 PROBLEM — D48.5 NEOPLASM OF UNCERTAIN BEHAVIOR OF SKIN: Status: ACTIVE | Noted: 2021-08-09

## 2021-08-09 PROBLEM — L55.1 SUNBURN OF SECOND DEGREE: Status: ACTIVE | Noted: 2021-08-09

## 2021-08-09 PROCEDURE — 17003 DESTRUCT PREMALG LES 2-14: CPT

## 2021-08-09 PROCEDURE — 11102 TANGNTL BX SKIN SINGLE LES: CPT

## 2021-08-09 PROCEDURE — ? BIOPSY BY SHAVE METHOD

## 2021-08-09 PROCEDURE — 17000 DESTRUCT PREMALG LESION: CPT | Mod: 59

## 2021-08-09 PROCEDURE — ? PRESCRIPTION

## 2021-08-09 PROCEDURE — ? LIQUID NITROGEN

## 2021-08-09 PROCEDURE — ? COUNSELING

## 2021-08-09 PROCEDURE — 99214 OFFICE O/P EST MOD 30 MIN: CPT | Mod: 25

## 2021-08-09 RX ORDER — KETOCONAZOLE 2 %
SHAMPOO TOPICAL
Qty: 1 | Refills: 6 | Status: ERX | COMMUNITY
Start: 2021-08-09

## 2021-08-09 RX ADMIN — Medication: at 00:00

## 2021-08-09 ASSESSMENT — LOCATION DETAILED DESCRIPTION DERM
LOCATION DETAILED: RIGHT ANTERIOR DISTAL THIGH
LOCATION DETAILED: LEFT PROXIMAL DORSAL FOREARM
LOCATION DETAILED: LEFT DISTAL DORSAL FOREARM
LOCATION DETAILED: POSTERIOR MID-PARIETAL SCALP
LOCATION DETAILED: RIGHT PROXIMAL DORSAL FOREARM
LOCATION DETAILED: RIGHT LATERAL DORSAL WRIST

## 2021-08-09 ASSESSMENT — LOCATION SIMPLE DESCRIPTION DERM
LOCATION SIMPLE: LEFT FOREARM
LOCATION SIMPLE: RIGHT WRIST
LOCATION SIMPLE: RIGHT FOREARM
LOCATION SIMPLE: POSTERIOR SCALP
LOCATION SIMPLE: RIGHT THIGH

## 2021-08-09 ASSESSMENT — LOCATION ZONE DERM
LOCATION ZONE: LEG
LOCATION ZONE: SCALP
LOCATION ZONE: ARM

## 2021-08-09 NOTE — PROCEDURE: BIOPSY BY SHAVE METHOD
Bill [id number] For Specimen Handling/Conveyance To Laboratory?: no
Electrodesiccation And Curettage Text: The wound bed was treated with electrodesiccation and curettage after the biopsy was performed.
Was A Bandage Applied: Yes
Biopsy Type: H and E
Wound Care: Petrolatum
Type Of Destruction Used: Curettage
X Size Of Lesion In Cm: 0
Cryotherapy Text: The wound bed was treated with cryotherapy after the biopsy was performed.
Anesthesia Volume In Cc: 0.5
Depth Of Biopsy: dermis
Hemostasis: Aluminum Chloride
Silver Nitrate Text: The wound bed was treated with silver nitrate after the biopsy was performed.
Billing Type: Third-Party Bill
Accession #: [id number]
Electrodesiccation Text: The wound bed was treated with electrodesiccation after the biopsy was performed.
Anesthesia Type: 1% lidocaine with 1:100,000 epinephrine and a 1:6 solution of 8.4% sodium bicarbonate
Detail Level: Detailed
Dressing: bandage
Biopsy Method: Dermablade
Information: Selecting Yes will display possible errors in your note based on the variables you have selected. This validation is only offered as a suggestion for you. PLEASE NOTE THAT THE VALIDATION TEXT WILL BE REMOVED WHEN YOU FINALIZE YOUR NOTE. IF YOU WANT TO FAX A PRELIMINARY NOTE YOU WILL NEED TO TOGGLE THIS TO 'NO' IF YOU DO NOT WANT IT IN YOUR FAXED NOTE.
Curettage Text: The wound bed was treated with curettage after the biopsy was performed.

## 2021-08-09 NOTE — PROCEDURE: LIQUID NITROGEN
Consent: The patient's consent was obtained including but not limited to risks of crusting, scabbing, blistering, scarring, darker or lighter pigmentary change, recurrence, incomplete removal and infection.
Post-Care Instructions: I reviewed with the patient in detail post-care instructions. Patient is to wear sunprotection, and avoid picking at any of the treated lesions. Pt may apply Vaseline to crusted or scabbing areas.
Show Applicator Variable?: Yes
Render Note In Bullet Format When Appropriate: No
Number Of Freeze-Thaw Cycles: 1 freeze-thaw cycle
Detail Level: Simple
Duration Of Freeze Thaw-Cycle (Seconds): 3

## 2023-06-07 NOTE — PROGRESS NOTES
Ambulatory/Rehab Services H2 Model Falls Risk Assessment    Risk Factor Pts. ·   Confusion/Disorientation/Impulsivity  []    4 ·   Symptomatic Depression  []   2 ·   Altered Elimination  []   1 ·   Dizziness/Vertigo  []   1 ·   Gender (Male)  []   1 ·   Any administered antiepileptics (anticonvulsants):  []   2 ·   Any administered benzodiazepines:  []   1 ·   Visual Impairment (specify):  []   1 ·   Portable Oxygen Use  []   1 ·   Orthostatic ? BP  []   1 ·   History of Recent Falls (within 3 mos.)  []   5     Ability to Rise from Chair (choose one) Pts. ·   Ability to rise in a single movement  [x]   0 ·   Pushes up, successful in one attempt  []   1 ·   Multiple attempts, but successful  []   3 ·   Unable to rise without assistance  []   4   Total: (5 or greater = High Risk) 0     Falls Prevention Plan:   []                Physical Limitations to Exercise (specify):   []                Mobility Assistance Device (type):   []                Exercise/Equipment Adaptation (specify):    ©2010 [facility] Patent #[id number].  Federal Law prohibits the replication, distribution or use without written permission from [facility] [employer]
Yes

## (undated) DEVICE — 1010 S-DRAPE TOWEL DRAPE 10/BX: Brand: STERI-DRAPE™

## (undated) DEVICE — BLADE SHV CUT MENIS AGG + 4MM --

## (undated) DEVICE — 3M™ COBAN™ SELF-ADHERENT WRAP, 1586S, STERILE, 6 IN X 5 YD (15 CM X 4,5 M), 12 ROLLS/CASE: Brand: 3M™ COBAN™

## (undated) DEVICE — SUTURE PDS II SZ 0 L27IN ABSRB VLT L26MM CT-2 1/2 CIR Z334H

## (undated) DEVICE — DRAPE,U/SHT,SPLIT,FILM,60X84,STERILE: Brand: MEDLINE

## (undated) DEVICE — STOCKINETTE,IMPERVIOUS,12X48,STERILE: Brand: MEDLINE

## (undated) DEVICE — [THREADED CANNULA.  DO NOT RESTERILIZE,  DO NOT USE IF PACKAGE IS DAMAGED]: Brand: DRI-LOK

## (undated) DEVICE — OUTFLOW CASSETTE TUBING, DO NOT USE IF PACKAGE IS DAMAGED: Brand: CROSSFLOW

## (undated) DEVICE — 90-S, SUCTION PROBE, NON-BENDABLE, MAX CUT LEVEL 11: Brand: SERFAS ENERGY

## (undated) DEVICE — 2.3MM ICONIX DISPOSABLE DRILL: Brand: ICONIX

## (undated) DEVICE — (D)PREP SKN CHLRAPRP APPL 26ML -- CONVERT TO ITEM 371833

## (undated) DEVICE — DRAPE SHT 3 QTR PROXIMA 53X77 --

## (undated) DEVICE — NEEDLE SUT PASS FLX DISP RP360

## (undated) DEVICE — SOFT SILICONE HYDROCELLULAR FOAM DRESSING WITH LOCK AWAY LAYER: Brand: ALLEVYN LIFE XL 21X21 CTN10

## (undated) DEVICE — 3M™ IOBAN™ 2 ANTIMICROBIAL INCISE DRAPE 6650EZ: Brand: IOBAN™ 2

## (undated) DEVICE — SOLUTION IRRIG 3000ML 0.9% SOD CHL FLX CONT 0797208] ICU MEDICAL INC]

## (undated) DEVICE — T-MAX DISPOSABLE FACE MASK 8 PER BOX

## (undated) DEVICE — 4-PORT MANIFOLD: Brand: NEPTUNE 2

## (undated) DEVICE — NDL SPNE QNCKE 18GX3.5IN LF --

## (undated) DEVICE — SUT ETHLN 3-0 18IN PS2 BLK --

## (undated) DEVICE — INFLOW CASSETTE TUBING, DO NOT USE IF PACKAGE IS DAMAGED: Brand: CROSSFLOW

## (undated) DEVICE — SURGICAL PROCEDURE PACK BASIC ST FRANCIS